# Patient Record
Sex: FEMALE | Race: BLACK OR AFRICAN AMERICAN | NOT HISPANIC OR LATINO | Employment: PART TIME | URBAN - NONMETROPOLITAN AREA
[De-identification: names, ages, dates, MRNs, and addresses within clinical notes are randomized per-mention and may not be internally consistent; named-entity substitution may affect disease eponyms.]

---

## 2017-03-31 ENCOUNTER — OFFICE VISIT (OUTPATIENT)
Dept: FAMILY MEDICINE CLINIC | Facility: CLINIC | Age: 32
End: 2017-03-31

## 2017-03-31 VITALS
DIASTOLIC BLOOD PRESSURE: 76 MMHG | SYSTOLIC BLOOD PRESSURE: 120 MMHG | BODY MASS INDEX: 28.03 KG/M2 | HEIGHT: 67 IN | WEIGHT: 178.6 LBS

## 2017-03-31 DIAGNOSIS — J30.2 SEASONAL ALLERGIC RHINITIS, UNSPECIFIED ALLERGIC RHINITIS TRIGGER: ICD-10-CM

## 2017-03-31 DIAGNOSIS — J45.20 MILD INTERMITTENT ASTHMA WITHOUT COMPLICATION: Primary | ICD-10-CM

## 2017-03-31 DIAGNOSIS — T70.20XA ALTITUDE SICKNESS, INITIAL ENCOUNTER: ICD-10-CM

## 2017-03-31 PROCEDURE — 99213 OFFICE O/P EST LOW 20 MIN: CPT | Performed by: FAMILY MEDICINE

## 2017-03-31 RX ORDER — ACETAZOLAMIDE 125 MG/1
125 TABLET ORAL 2 TIMES DAILY
Qty: 10 TABLET | Refills: 1 | Status: SHIPPED | OUTPATIENT
Start: 2017-03-31 | End: 2017-05-22

## 2017-03-31 NOTE — PROGRESS NOTES
Subjective   Hebert Olmos is a 31 y.o. female.     History of Present Illness patient was concerned about altitude sickness.  Patient has a history of asthma states she goes to [place] for meeting she developed shortness of breath and headache for about 3 days.  Symptoms are more consistent with altitude sickness rather than asthma.  States her nebulizer does help which of course is the case with altitude sickness.  Have counseled on slower lacrimation but since she can't do that she can try Diamox per day or 2 but not more than that The Dehydration Affect.  Otherwise Use Medicines As Directed.  Time Spent Counseling.    The following portions of the patient's history were reviewed and updated as appropriate: allergies, current medications, past family history, past medical history, past social history, past surgical history and problem list.    Review of Systems   Constitutional: Negative for activity change, appetite change, fatigue and unexpected weight change.   HENT: Negative for trouble swallowing and voice change.    Eyes: Negative for redness and visual disturbance.   Respiratory: Negative for cough and wheezing.    Cardiovascular: Negative for chest pain and palpitations.   Gastrointestinal: Negative for abdominal pain, constipation, diarrhea, nausea and vomiting.   Genitourinary: Negative for urgency.   Musculoskeletal: Negative for joint swelling.   Neurological: Negative for syncope and headaches.   Hematological: Negative for adenopathy.   Psychiatric/Behavioral: Negative for sleep disturbance.       Objective   Physical Exam   Constitutional: She appears well-developed.   HENT:   Head: Normocephalic.   Eyes: Pupils are equal, round, and reactive to light.   Cardiovascular: Normal rate.    Pulmonary/Chest: Effort normal.   Psychiatric: She has a normal mood and affect.       Assessment/Plan   Hebert was seen today for asthma.    Diagnoses and all orders for this visit:    Mild intermittent asthma  without complication    Seasonal allergic rhinitis, unspecified allergic rhinitis trigger    Altitude sickness, initial encounter  -     acetaZOLAMIDE (DIAMOX) 125 MG tablet; Take 1 tablet by mouth 2 (Two) Times a Day. For 1-2d for altitude

## 2017-05-22 ENCOUNTER — OFFICE VISIT (OUTPATIENT)
Dept: FAMILY MEDICINE CLINIC | Facility: CLINIC | Age: 32
End: 2017-05-22

## 2017-05-22 VITALS
WEIGHT: 180.6 LBS | SYSTOLIC BLOOD PRESSURE: 122 MMHG | BODY MASS INDEX: 28.35 KG/M2 | HEIGHT: 67 IN | DIASTOLIC BLOOD PRESSURE: 84 MMHG

## 2017-05-22 DIAGNOSIS — F43.12 CHRONIC POST-TRAUMATIC STRESS DISORDER: Chronic | ICD-10-CM

## 2017-05-22 DIAGNOSIS — F32.A DEPRESSIVE DISORDER: Chronic | ICD-10-CM

## 2017-05-22 DIAGNOSIS — F41.9 ANXIETY: Primary | Chronic | ICD-10-CM

## 2017-05-22 PROCEDURE — 99214 OFFICE O/P EST MOD 30 MIN: CPT | Performed by: FAMILY MEDICINE

## 2017-05-22 RX ORDER — HYDROXYZINE 50 MG/1
50 TABLET, FILM COATED ORAL 2 TIMES DAILY
Qty: 60 TABLET | Refills: 5 | Status: SHIPPED | OUTPATIENT
Start: 2017-05-22 | End: 2017-12-28 | Stop reason: HOSPADM

## 2017-05-22 RX ORDER — FLUOXETINE 10 MG/1
10 CAPSULE ORAL 2 TIMES DAILY
Qty: 60 CAPSULE | Refills: 5 | Status: SHIPPED | OUTPATIENT
Start: 2017-05-22 | End: 2017-06-28

## 2017-06-14 ENCOUNTER — OFFICE VISIT (OUTPATIENT)
Dept: FAMILY MEDICINE CLINIC | Facility: CLINIC | Age: 32
End: 2017-06-14

## 2017-06-14 VITALS
DIASTOLIC BLOOD PRESSURE: 84 MMHG | BODY MASS INDEX: 28.33 KG/M2 | SYSTOLIC BLOOD PRESSURE: 136 MMHG | HEIGHT: 67 IN | WEIGHT: 180.5 LBS

## 2017-06-14 DIAGNOSIS — F32.A DEPRESSIVE DISORDER: Chronic | ICD-10-CM

## 2017-06-14 DIAGNOSIS — F43.12 CHRONIC POST-TRAUMATIC STRESS DISORDER: Chronic | ICD-10-CM

## 2017-06-14 DIAGNOSIS — F41.9 ANXIETY: Primary | Chronic | ICD-10-CM

## 2017-06-14 PROCEDURE — 99214 OFFICE O/P EST MOD 30 MIN: CPT | Performed by: FAMILY MEDICINE

## 2017-06-14 RX ORDER — CLONAZEPAM 1 MG/1
TABLET ORAL
Qty: 14 TABLET | Refills: 0 | Status: SHIPPED | OUTPATIENT
Start: 2017-06-14 | End: 2017-06-28 | Stop reason: SDUPTHER

## 2017-06-14 NOTE — PROGRESS NOTES
Subjective   Hebert Olmos is a 31 y.o. female.     History of Present Illness patient states increasing anxiety apparently her anniversary of parents death this month.  Has not visited her therapist distal end of the month.  Complaining of insomnia posterior medical stress etc.  States is taking her regular medicine.  I have counseled again on the need for behavioral therapy.  Also has an upcoming colonoscopy.  History noted.    The following portions of the patient's history were reviewed and updated as appropriate: allergies, current medications, past family history, past medical history, past social history, past surgical history and problem list.    Review of Systems   Constitutional: Negative for activity change, appetite change, fatigue and unexpected weight change.   HENT: Negative for trouble swallowing and voice change.    Eyes: Negative for redness and visual disturbance.   Respiratory: Negative for cough and wheezing.    Cardiovascular: Negative for chest pain and palpitations.   Gastrointestinal: Negative for abdominal pain, constipation, diarrhea, nausea and vomiting.   Genitourinary: Negative for urgency.   Musculoskeletal: Negative for joint swelling.   Neurological: Negative for syncope and headaches.   Hematological: Negative for adenopathy.   Psychiatric/Behavioral: Positive for decreased concentration, dysphoric mood and sleep disturbance. The patient is nervous/anxious and is hyperactive.        Objective   Physical Exam   Constitutional: She is oriented to person, place, and time.   HENT:   Head: Normocephalic.   Eyes: Pupils are equal, round, and reactive to light.   Neck: Normal range of motion.   Cardiovascular: Normal rate.    Pulmonary/Chest: Effort normal.   Abdominal: Soft.   Musculoskeletal: Normal range of motion.   Neurological: She is alert and oriented to person, place, and time.   Psychiatric: She has a normal mood and affect. Her speech is rapid and/or pressured. She is  agitated. Cognition and memory are normal.       Assessment/Plan   Hebert was seen today for anxiety.    Diagnoses and all orders for this visit:    Anxiety  -     clonazePAM (KlonoPIN) 1 MG tablet; One tab bid everyday till seen again    Chronic post-traumatic stress disorder    Depressive disorder        need for therapy  on above.  Short course of clonazepam for the next 2 weeks.  Continue current medicines.  Recheck 2 weeks.

## 2017-06-28 ENCOUNTER — OFFICE VISIT (OUTPATIENT)
Dept: FAMILY MEDICINE CLINIC | Facility: CLINIC | Age: 32
End: 2017-06-28

## 2017-06-28 VITALS
DIASTOLIC BLOOD PRESSURE: 78 MMHG | BODY MASS INDEX: 27.47 KG/M2 | WEIGHT: 175 LBS | SYSTOLIC BLOOD PRESSURE: 120 MMHG | HEIGHT: 67 IN

## 2017-06-28 DIAGNOSIS — F32.A DEPRESSIVE DISORDER: Chronic | ICD-10-CM

## 2017-06-28 DIAGNOSIS — F43.12 CHRONIC POST-TRAUMATIC STRESS DISORDER: Chronic | ICD-10-CM

## 2017-06-28 DIAGNOSIS — F41.9 ANXIETY: Primary | Chronic | ICD-10-CM

## 2017-06-28 PROCEDURE — 99213 OFFICE O/P EST LOW 20 MIN: CPT | Performed by: FAMILY MEDICINE

## 2017-06-28 RX ORDER — ONDANSETRON 4 MG/1
4 TABLET, FILM COATED ORAL AS NEEDED
Refills: 0 | COMMUNITY
Start: 2017-06-22 | End: 2017-12-28 | Stop reason: HOSPADM

## 2017-06-28 RX ORDER — CITALOPRAM 20 MG/1
20 TABLET ORAL DAILY
Refills: 0 | COMMUNITY
Start: 2017-06-22 | End: 2017-12-28 | Stop reason: HOSPADM

## 2017-06-28 RX ORDER — CLONAZEPAM 1 MG/1
TABLET ORAL
Qty: 30 TABLET | Refills: 2 | Status: SHIPPED | OUTPATIENT
Start: 2017-06-28 | End: 2017-09-28 | Stop reason: SDUPTHER

## 2017-06-28 NOTE — PROGRESS NOTES
Subjective   Hebert Olmos is a 31 y.o. female.     History of Present Illness reevaluation medications namely clonazepam.  In interim patient been to her counselor at her Prozac switched to Celexa.  Feeling much better.  States clonazepam caused daytime drowsiness but started taking at night sleeping better functioning better.  We have counseled pros and cons her chronic use.  We will continue clonazepam at 1 mg daily at bedtime for the next 90 days.  Counseled following up with her behavioral counseled on therapist as outlined.  Recheck again in 3 months    The following portions of the patient's history were reviewed and updated as appropriate: allergies, current medications, past family history, past medical history, past social history, past surgical history and problem list.   ok    Review of Systems   Constitutional: Negative for activity change, appetite change, fatigue and unexpected weight change.   HENT: Negative for trouble swallowing and voice change.    Eyes: Negative for redness and visual disturbance.   Respiratory: Negative for cough and wheezing.    Cardiovascular: Negative for chest pain and palpitations.   Gastrointestinal: Negative for abdominal pain, constipation, diarrhea, nausea and vomiting.   Genitourinary: Negative for urgency.   Musculoskeletal: Negative for joint swelling.   Neurological: Negative for syncope and headaches.   Hematological: Negative for adenopathy.   Psychiatric/Behavioral: Negative for sleep disturbance.       Objective   Physical Exam   Constitutional: She appears well-developed.   HENT:   Head: Normocephalic.   Eyes: Pupils are equal, round, and reactive to light.   Neck: Normal range of motion.   Neurological: She is alert.   Psychiatric: Her speech is normal and behavior is normal. Thought content normal. Her mood appears anxious.       Assessment/Plan   Hebert was seen today for follow-up.    Diagnoses and all orders for this visit:    Anxiety  -      clonazePAM (KlonoPIN) 1 MG tablet; 1 q hs every night    Chronic post-traumatic stress disorder    Depressive disorder

## 2017-09-28 ENCOUNTER — OFFICE VISIT (OUTPATIENT)
Dept: FAMILY MEDICINE CLINIC | Facility: CLINIC | Age: 32
End: 2017-09-28

## 2017-09-28 VITALS
BODY MASS INDEX: 28.08 KG/M2 | SYSTOLIC BLOOD PRESSURE: 120 MMHG | WEIGHT: 178.9 LBS | DIASTOLIC BLOOD PRESSURE: 78 MMHG | HEIGHT: 67 IN

## 2017-09-28 DIAGNOSIS — F41.9 ANXIETY: Primary | Chronic | ICD-10-CM

## 2017-09-28 DIAGNOSIS — F32.A DEPRESSIVE DISORDER: Chronic | ICD-10-CM

## 2017-09-28 PROCEDURE — 99213 OFFICE O/P EST LOW 20 MIN: CPT | Performed by: FAMILY MEDICINE

## 2017-09-28 RX ORDER — CLONAZEPAM 1 MG/1
TABLET ORAL
Qty: 30 TABLET | Refills: 2 | Status: SHIPPED | OUTPATIENT
Start: 2017-09-28 | End: 2017-12-28 | Stop reason: HOSPADM

## 2017-09-28 NOTE — PROGRESS NOTES
Subjective   Hebert Olmos is a 32 y.o. female.     History of Present Illness  follow-up generalized anxiety disorder depressive disorder.  Continuing on counseling as well as her SSRI.  Takes clonazepam at night with good effect.  Wishes to continue..  Counseled on the need for not stopping abruptly.  Also counseled on visiting with her gynecologist routinely.  Declines flu vaccine.    The following portions of the patient's history were reviewed and updated as appropriate: allergies, current medications, past family history, past medical history, past social history, past surgical history and problem list.    Review of Systems   Constitutional: Negative for activity change, appetite change, fatigue and unexpected weight change.   HENT: Negative for trouble swallowing and voice change.    Eyes: Negative for redness and visual disturbance.   Respiratory: Negative for cough and wheezing.    Cardiovascular: Negative for chest pain and palpitations.   Gastrointestinal: Negative for abdominal pain, constipation, diarrhea, nausea and vomiting.   Genitourinary: Negative for urgency.   Musculoskeletal: Negative for joint swelling.   Neurological: Negative for syncope and headaches.   Hematological: Negative for adenopathy.   Psychiatric/Behavioral: Negative for sleep disturbance.       Objective   Physical Exam   HENT:   Head: Normocephalic.   Eyes: Pupils are equal, round, and reactive to light.   Neck: Normal range of motion.   Cardiovascular: Normal rate.    Pulmonary/Chest: Effort normal.   Abdominal: Soft.   Psychiatric: Her speech is normal and behavior is normal. Her mood appears anxious. Cognition and memory are normal.       Assessment/Plan   Hebert was seen today for anxiety.    Diagnoses and all orders for this visit:    Anxiety  -     clonazePAM (KlonoPIN) 1 MG tablet; 1 q hs every night    Depressive disorder       Follow-up as above 3 months

## 2017-12-28 ENCOUNTER — OFFICE VISIT (OUTPATIENT)
Dept: FAMILY MEDICINE CLINIC | Facility: CLINIC | Age: 32
End: 2017-12-28

## 2017-12-28 VITALS
WEIGHT: 182.7 LBS | DIASTOLIC BLOOD PRESSURE: 80 MMHG | SYSTOLIC BLOOD PRESSURE: 122 MMHG | HEIGHT: 67 IN | BODY MASS INDEX: 28.67 KG/M2

## 2017-12-28 DIAGNOSIS — F41.9 ANXIETY: Primary | Chronic | ICD-10-CM

## 2017-12-28 DIAGNOSIS — K50.00 CROHN'S DISEASE OF SMALL INTESTINE WITHOUT COMPLICATION (HCC): ICD-10-CM

## 2017-12-28 DIAGNOSIS — F32.A DEPRESSIVE DISORDER: Chronic | ICD-10-CM

## 2017-12-28 DIAGNOSIS — F43.12 CHRONIC POST-TRAUMATIC STRESS DISORDER: Chronic | ICD-10-CM

## 2017-12-28 PROCEDURE — 99213 OFFICE O/P EST LOW 20 MIN: CPT | Performed by: FAMILY MEDICINE

## 2017-12-28 RX ORDER — PROMETHAZINE HYDROCHLORIDE 12.5 MG/1
12.5 TABLET ORAL EVERY 6 HOURS PRN
COMMUNITY
End: 2018-08-21

## 2017-12-28 RX ORDER — BUDESONIDE 3 MG/1
6 CAPSULE, COATED PELLETS ORAL EVERY MORNING
COMMUNITY
End: 2019-01-09

## 2017-12-28 NOTE — PROGRESS NOTES
Subjective   Hebert Olmos is a 32 y.o. female.     History of Present Illness  reevaluation anxiety depression posterior medical stress etc.  Interim states is been diagnosed with Crohn's disease instead of IBS.  Being seen by gi for same.  Has been seeing her therapist.  Has electively stopped all her SSRI and clonazepam.  Wishes to proceed forward with talk therapy and behavior cognitive behavioral therapy.   at this point would be deferring to both her gastroenterologist as well as her therapist.  We'll be seeing on a when necessary acute bases as needed.  Also have counseled on the need to visit with her gynecologist just first year get a Pap smear.    The following portions of the patient's history were reviewed and updated as appropriate: allergies, current medications, past family history, past medical history, past social history, past surgical history and problem list.    Review of Systems   Constitutional: Negative for activity change, appetite change, fatigue and unexpected weight change.   HENT: Negative for trouble swallowing and voice change.    Eyes: Negative for redness and visual disturbance.   Respiratory: Negative for cough and wheezing.    Cardiovascular: Negative for chest pain and palpitations.   Gastrointestinal: Negative for abdominal pain, constipation, diarrhea, nausea and vomiting.   Genitourinary: Negative for urgency.   Musculoskeletal: Negative for joint swelling.   Neurological: Negative for syncope and headaches.   Hematological: Negative for adenopathy.   Psychiatric/Behavioral: Negative for sleep disturbance. The patient is nervous/anxious.        Objective   Physical Exam   HENT:   Head: Normocephalic.   Eyes: Pupils are equal, round, and reactive to light.   Neck: Normal range of motion.   Cardiovascular: Normal rate.    Pulmonary/Chest: Effort normal.   Abdominal: Soft.   Psychiatric: Her behavior is normal. Her mood appears anxious. Her speech is rapid and/or  pressured.       Assessment/Plan   Hebert was seen today for anxiety.    Diagnoses and all orders for this visit:    Anxiety    Depressive disorder    Chronic post-traumatic stress disorder    Crohn's disease of small intestine without complication     Plan plan as above

## 2018-01-02 RX ORDER — CLONAZEPAM 1 MG/1
TABLET ORAL
Qty: 30 TABLET | Refills: 0 | OUTPATIENT
Start: 2018-01-02

## 2018-03-12 ENCOUNTER — TELEPHONE (OUTPATIENT)
Dept: FAMILY MEDICINE CLINIC | Facility: CLINIC | Age: 33
End: 2018-03-12

## 2018-03-20 ENCOUNTER — TELEPHONE (OUTPATIENT)
Dept: FAMILY MEDICINE CLINIC | Facility: CLINIC | Age: 33
End: 2018-03-20

## 2018-03-20 RX ORDER — ALBUTEROL SULFATE 90 UG/1
2 AEROSOL, METERED RESPIRATORY (INHALATION) EVERY 4 HOURS PRN
Qty: 1 INHALER | Refills: 3 | Status: SHIPPED | OUTPATIENT
Start: 2018-03-20 | End: 2019-01-09 | Stop reason: SDUPTHER

## 2018-08-21 ENCOUNTER — OFFICE VISIT (OUTPATIENT)
Dept: FAMILY MEDICINE CLINIC | Facility: CLINIC | Age: 33
End: 2018-08-21

## 2018-08-21 VITALS
HEIGHT: 67 IN | DIASTOLIC BLOOD PRESSURE: 80 MMHG | WEIGHT: 164 LBS | SYSTOLIC BLOOD PRESSURE: 118 MMHG | TEMPERATURE: 98 F | BODY MASS INDEX: 25.74 KG/M2

## 2018-08-21 DIAGNOSIS — J30.2 ACUTE SEASONAL ALLERGIC RHINITIS DUE TO OTHER ALLERGEN: ICD-10-CM

## 2018-08-21 DIAGNOSIS — J06.9 ACUTE URI: Primary | ICD-10-CM

## 2018-08-21 DIAGNOSIS — K50.00 CROHN'S DISEASE OF SMALL INTESTINE WITHOUT COMPLICATION (HCC): ICD-10-CM

## 2018-08-21 PROCEDURE — 99213 OFFICE O/P EST LOW 20 MIN: CPT | Performed by: FAMILY MEDICINE

## 2018-08-21 RX ORDER — SOD CHLOR,SOD BICARB/NETI POT
PACKET, WITH RINSE DEVICE NASAL
Qty: 50 EACH | Refills: 5 | Status: SHIPPED | OUTPATIENT
Start: 2018-08-21 | End: 2019-01-09

## 2018-08-21 RX ORDER — PREDNISONE 20 MG/1
TABLET ORAL
Qty: 10 TABLET | Refills: 0 | Status: SHIPPED | OUTPATIENT
Start: 2018-08-21 | End: 2019-01-09

## 2018-08-21 RX ORDER — FLUTICASONE PROPIONATE 50 MCG
2 SPRAY, SUSPENSION (ML) NASAL DAILY
Qty: 1 BOTTLE | Refills: 5 | Status: SHIPPED | OUTPATIENT
Start: 2018-08-21 | End: 2019-01-09

## 2018-08-21 NOTE — PROGRESS NOTES
Subjective   Hebert Olmos is a 33 y.o. female.     History of Present Illness   resting evaluation to 3 days sinus pressure drainage postnasal drip cough.  Some infectious some environmental overlay.  Not been treating it with anything.  States Crohn's disease is pretty much under control and is due for follow-up with GI next month.  Is on no long-term renal suppressants at present.  The following portions of the patient's history were reviewed and updated as appropriate: allergies, current medications, past family history, past medical history, past social history, past surgical history and problem list.    Review of Systems   Constitutional: Negative for activity change, appetite change, fatigue and unexpected weight change.   HENT: Positive for congestion, rhinorrhea and sinus pressure. Negative for trouble swallowing and voice change.    Eyes: Negative for redness and visual disturbance.   Respiratory: Positive for cough. Negative for wheezing.    Cardiovascular: Negative for chest pain and palpitations.   Gastrointestinal: Negative for abdominal pain, constipation, diarrhea, nausea and vomiting.   Genitourinary: Negative for urgency.   Musculoskeletal: Negative for joint swelling.   Neurological: Positive for headaches. Negative for syncope.   Hematological: Negative for adenopathy.   Psychiatric/Behavioral: Negative for sleep disturbance.       Objective   Physical Exam   Constitutional: She is oriented to person, place, and time. She appears well-developed.   HENT:   Head: Normocephalic.   Right Ear: External ear normal.   Nose: Mucosal edema and rhinorrhea present.   Mouth/Throat: Oropharynx is clear and moist.   Eyes: Pupils are equal, round, and reactive to light.   Minimal allergic shiners   Neck: Normal range of motion. No thyromegaly present.   Cardiovascular: Normal rate, regular rhythm, normal heart sounds and intact distal pulses.  Exam reveals no gallop and no friction rub.    No murmur  heard.  Pulmonary/Chest: Breath sounds normal.   Abdominal: Soft. She exhibits no distension and no mass. There is no tenderness.   Musculoskeletal: Normal range of motion.   Neurological: She is alert and oriented to person, place, and time. She has normal reflexes.   Skin: Skin is warm and dry.   Psychiatric: She has a normal mood and affect.       Assessment/Plan   Hebert was seen today for sinusitis.    Diagnoses and all orders for this visit:    Acute URI  -     fluticasone (FLONASE) 50 MCG/ACT nasal spray; 2 sprays into the nostril(s) as directed by provider Daily. X 4wks then prn  -     Hypertonic Nasal Wash (NASAFLO NETI POT NASAL WASH) pack; Use bid prn  -     predniSONE (DELTASONE) 20 MG tablet; 2qdx 5    Acute seasonal allergic rhinitis due to other allergen  -     fluticasone (FLONASE) 50 MCG/ACT nasal spray; 2 sprays into the nostril(s) as directed by provider Daily. X 4wks then prn  -     Hypertonic Nasal Wash (NASAFLO NETI POT NASAL WASH) pack; Use bid prn  -     predniSONE (DELTASONE) 20 MG tablet; 2qdx 5    Crohn's disease of small intestine without complication (CMS/HCC)        Counseled on short-term medicines hydration some environmental control measures flu vaccine in the fall recheck as directed

## 2019-01-09 ENCOUNTER — OFFICE VISIT (OUTPATIENT)
Dept: FAMILY MEDICINE CLINIC | Facility: CLINIC | Age: 34
End: 2019-01-09

## 2019-01-09 VITALS
HEIGHT: 67 IN | TEMPERATURE: 97 F | SYSTOLIC BLOOD PRESSURE: 120 MMHG | WEIGHT: 159.1 LBS | BODY MASS INDEX: 24.97 KG/M2 | DIASTOLIC BLOOD PRESSURE: 82 MMHG

## 2019-01-09 DIAGNOSIS — J06.9 ACUTE URI: Primary | ICD-10-CM

## 2019-01-09 DIAGNOSIS — J45.20 MILD INTERMITTENT ASTHMA WITHOUT COMPLICATION: Chronic | ICD-10-CM

## 2019-01-09 DIAGNOSIS — J40 BRONCHITIS: ICD-10-CM

## 2019-01-09 DIAGNOSIS — R06.2 WHEEZING: ICD-10-CM

## 2019-01-09 PROCEDURE — 96372 THER/PROPH/DIAG INJ SC/IM: CPT | Performed by: FAMILY MEDICINE

## 2019-01-09 PROCEDURE — 99214 OFFICE O/P EST MOD 30 MIN: CPT | Performed by: FAMILY MEDICINE

## 2019-01-09 RX ORDER — PREDNISONE 20 MG/1
TABLET ORAL
Qty: 15 TABLET | Refills: 0 | Status: SHIPPED | OUTPATIENT
Start: 2019-01-09 | End: 2020-02-26

## 2019-01-09 RX ORDER — ALBUTEROL SULFATE 1.25 MG/3ML
1 SOLUTION RESPIRATORY (INHALATION) EVERY 6 HOURS PRN
Qty: 90 VIAL | Refills: 12 | Status: SHIPPED | OUTPATIENT
Start: 2019-01-09

## 2019-01-09 RX ORDER — ALBUTEROL SULFATE 90 UG/1
2 AEROSOL, METERED RESPIRATORY (INHALATION) EVERY 4 HOURS PRN
Qty: 1 INHALER | Refills: 3 | Status: SHIPPED | OUTPATIENT
Start: 2019-01-09 | End: 2020-02-26

## 2019-01-09 RX ORDER — TRIAMCINOLONE ACETONIDE 40 MG/ML
40 INJECTION, SUSPENSION INTRA-ARTICULAR; INTRAMUSCULAR ONCE
Status: COMPLETED | OUTPATIENT
Start: 2019-01-09 | End: 2019-01-09

## 2019-01-09 RX ORDER — LEVOFLOXACIN 500 MG/1
500 TABLET, FILM COATED ORAL DAILY
Qty: 10 TABLET | Refills: 0 | OUTPATIENT
Start: 2019-01-09 | End: 2019-08-16

## 2019-01-09 RX ADMIN — TRIAMCINOLONE ACETONIDE 40 MG: 40 INJECTION, SUSPENSION INTRA-ARTICULAR; INTRAMUSCULAR at 14:57

## 2019-01-09 NOTE — PROGRESS NOTES
Subjective   Hebert Olmos is a 33 y.o. female.     History of Present Illness   request evaluation 1 week history of cough congestion coryza wheeze flare of asthma.  No real fever minimal sputum production have been using nebulizer at home.  Has not had a flu vaccine yet will get in the future..  Also is been little with GI specialist was told may have gastroparesis and may require a gastric pacemaker in the future    The following portions of the patient's history were reviewed and updated as appropriate: allergies, current medications, past family history, past medical history, past social history, past surgical history and problem list.    Review of Systems   Constitutional: Negative for activity change, appetite change, fatigue and unexpected weight change.   HENT: Positive for congestion. Negative for trouble swallowing and voice change.    Eyes: Negative for redness and visual disturbance.   Respiratory: Positive for cough, shortness of breath and wheezing.    Cardiovascular: Negative for chest pain and palpitations.   Gastrointestinal: Negative for abdominal pain, constipation, diarrhea, nausea and vomiting.   Genitourinary: Negative for urgency.   Musculoskeletal: Negative for joint swelling.   Neurological: Negative for syncope and headaches.   Hematological: Negative for adenopathy.   Psychiatric/Behavioral: Negative for sleep disturbance.       Objective   Physical Exam   Constitutional: She is oriented to person, place, and time. She appears well-developed.   HENT:   Head: Normocephalic.   Right Ear: External ear normal.   Nose: Mucosal edema present.   Mouth/Throat: Oropharynx is clear and moist.   Eyes: Pupils are equal, round, and reactive to light.   Neck: Normal range of motion. No thyromegaly present.   Cardiovascular: Normal rate, regular rhythm, normal heart sounds and intact distal pulses. Exam reveals no gallop and no friction rub.   No murmur heard.  Pulmonary/Chest: She has rhonchi in  the right middle field, the left middle field and the left lower field.   Expiratory wheezes mention minimal increased expiratory phase normal rate intermittent coughing   Abdominal: Soft. She exhibits no distension and no mass. There is no tenderness.   Musculoskeletal: Normal range of motion.   Neurological: She is alert and oriented to person, place, and time. She has normal reflexes.   Skin: Skin is warm and dry.   Psychiatric: She has a normal mood and affect.       Assessment/Plan   Hebert was seen today for cough.    Diagnoses and all orders for this visit:    Acute URI    Bronchitis  -     albuterol (ACCUNEB) 1.25 MG/3ML nebulizer solution; Take 3 mL by nebulization Every 6 (Six) Hours As Needed for Wheezing.  -     predniSONE (DELTASONE) 20 MG tablet; 2 qd x 5 then 1qd x 5 stop  For lungs  -     levoFLOXacin (LEVAQUIN) 500 MG tablet; Take 1 tablet by mouth Daily. Till gone for lungs    Wheezing  -     albuterol sulfate  (90 Base) MCG/ACT inhaler; Inhale 2 puffs Every 4 (Four) Hours As Needed for Wheezing.  -     triamcinolone acetonide (KENALOG-40) injection 40 mg; Inject 1 mL into the appropriate muscle as directed by prescriber 1 (One) Time.  -     albuterol (ACCUNEB) 1.25 MG/3ML nebulizer solution; Take 3 mL by nebulization Every 6 (Six) Hours As Needed for Wheezing.  -     predniSONE (DELTASONE) 20 MG tablet; 2 qd x 5 then 1qd x 5 stop  For lungs  -     levoFLOXacin (LEVAQUIN) 500 MG tablet; Take 1 tablet by mouth Daily. Till gone for lungs    Mild intermittent asthma without complication  -     albuterol (ACCUNEB) 1.25 MG/3ML nebulizer solution; Take 3 mL by nebulization Every 6 (Six) Hours As Needed for Wheezing.  -     predniSONE (DELTASONE) 20 MG tablet; 2 qd x 5 then 1qd x 5 stop  For lungs  -     levoFLOXacin (LEVAQUIN) 500 MG tablet; Take 1 tablet by mouth Daily. Till gone for lungs       need for increasing hydration fluids vaporizer's etc. increased nebulizer use short-term  medication ordered counseled on disease process rechecks directed

## 2019-03-31 ENCOUNTER — APPOINTMENT (OUTPATIENT)
Dept: GENERAL RADIOLOGY | Facility: HOSPITAL | Age: 34
End: 2019-03-31

## 2019-03-31 ENCOUNTER — HOSPITAL ENCOUNTER (EMERGENCY)
Facility: HOSPITAL | Age: 34
Discharge: HOME | End: 2019-03-31
Attending: EMERGENCY MEDICINE | Admitting: EMERGENCY MEDICINE

## 2019-03-31 VITALS
BODY MASS INDEX: 23.16 KG/M2 | WEIGHT: 147.56 LBS | TEMPERATURE: 97.7 F | HEART RATE: 72 BPM | OXYGEN SATURATION: 100 % | SYSTOLIC BLOOD PRESSURE: 110 MMHG | RESPIRATION RATE: 18 BRPM | HEIGHT: 67 IN | DIASTOLIC BLOOD PRESSURE: 65 MMHG

## 2019-03-31 DIAGNOSIS — R11.2 NON-INTRACTABLE VOMITING WITH NAUSEA, UNSPECIFIED VOMITING TYPE: Primary | ICD-10-CM

## 2019-03-31 DIAGNOSIS — K31.84 GASTROPARESIS: ICD-10-CM

## 2019-03-31 LAB
ALBUMIN SERPL-MCNC: 4.8 G/DL (ref 3.5–5.2)
ALBUMIN/GLOB SERPL: 1.2 G/DL
ALP SERPL-CCNC: 64 U/L (ref 39–117)
ALT SERPL W P-5'-P-CCNC: 14 U/L (ref 1–33)
ANION GAP SERPL CALCULATED.3IONS-SCNC: 17 MMOL/L
AST SERPL-CCNC: 18 U/L (ref 1–32)
BACTERIA UR QL AUTO: ABNORMAL /HPF
BASOPHILS # BLD AUTO: 0.04 10*3/MM3 (ref 0–0.2)
BASOPHILS NFR BLD AUTO: 0.3 % (ref 0–1.5)
BILIRUB SERPL-MCNC: 0.4 MG/DL (ref 0.2–1.2)
BILIRUB UR QL STRIP: ABNORMAL
BUN BLD-MCNC: 15 MG/DL (ref 6–20)
BUN/CREAT SERPL: 17.2 (ref 7–25)
CALCIUM SPEC-SCNC: 10 MG/DL (ref 8.6–10.5)
CHLORIDE SERPL-SCNC: 100 MMOL/L (ref 98–107)
CLARITY UR: ABNORMAL
CO2 SERPL-SCNC: 22 MMOL/L (ref 22–29)
COLOR UR: YELLOW
CREAT BLD-MCNC: 0.87 MG/DL (ref 0.57–1)
DEPRECATED RDW RBC AUTO: 46.5 FL (ref 37–54)
EOSINOPHIL # BLD AUTO: 0.04 10*3/MM3 (ref 0–0.4)
EOSINOPHIL NFR BLD AUTO: 0.3 % (ref 0.3–6.2)
ERYTHROCYTE [DISTWIDTH] IN BLOOD BY AUTOMATED COUNT: 13.7 % (ref 12.3–15.4)
GFR SERPL CREATININE-BSD FRML MDRD: 90 ML/MIN/1.73
GLOBULIN UR ELPH-MCNC: 4.1 GM/DL
GLUCOSE BLD-MCNC: 102 MG/DL (ref 65–99)
GLUCOSE UR STRIP-MCNC: NEGATIVE MG/DL
HCG SERPL QL: NEGATIVE
HCT VFR BLD AUTO: 47.5 % (ref 34–46.6)
HGB BLD-MCNC: 15.8 G/DL (ref 12–15.9)
HGB UR QL STRIP.AUTO: ABNORMAL
HOLD SPECIMEN: NORMAL
HOLD SPECIMEN: NORMAL
HYALINE CASTS UR QL AUTO: ABNORMAL /LPF
IMM GRANULOCYTES # BLD AUTO: 0.05 10*3/MM3 (ref 0–0.05)
IMM GRANULOCYTES NFR BLD AUTO: 0.4 % (ref 0–0.5)
KETONES UR QL STRIP: ABNORMAL
LEUKOCYTE ESTERASE UR QL STRIP.AUTO: NEGATIVE
LYMPHOCYTES # BLD AUTO: 0.99 10*3/MM3 (ref 0.7–3.1)
LYMPHOCYTES NFR BLD AUTO: 8.4 % (ref 19.6–45.3)
MCH RBC QN AUTO: 30.3 PG (ref 26.6–33)
MCHC RBC AUTO-ENTMCNC: 33.3 G/DL (ref 31.5–35.7)
MCV RBC AUTO: 91 FL (ref 79–97)
MONOCYTES # BLD AUTO: 0.72 10*3/MM3 (ref 0.1–0.9)
MONOCYTES NFR BLD AUTO: 6.1 % (ref 5–12)
NEUTROPHILS # BLD AUTO: 9.95 10*3/MM3 (ref 1.4–7)
NEUTROPHILS NFR BLD AUTO: 84.5 % (ref 42.7–76)
NITRITE UR QL STRIP: NEGATIVE
NRBC BLD AUTO-RTO: 0 /100 WBC (ref 0–0)
PH UR STRIP.AUTO: 5.5 [PH] (ref 5–9)
PLATELET # BLD AUTO: 226 10*3/MM3 (ref 140–450)
PMV BLD AUTO: 11.3 FL (ref 6–12)
POTASSIUM BLD-SCNC: 4.5 MMOL/L (ref 3.5–5.2)
PROT SERPL-MCNC: 8.9 G/DL (ref 6–8.5)
PROT UR QL STRIP: ABNORMAL
RBC # BLD AUTO: 5.22 10*6/MM3 (ref 3.77–5.28)
RBC # UR: ABNORMAL /HPF
REF LAB TEST METHOD: ABNORMAL
SODIUM BLD-SCNC: 139 MMOL/L (ref 136–145)
SP GR UR STRIP: 1.03 (ref 1–1.03)
SQUAMOUS #/AREA URNS HPF: ABNORMAL /HPF
UROBILINOGEN UR QL STRIP: ABNORMAL
WBC NRBC COR # BLD: 11.79 10*3/MM3 (ref 3.4–10.8)
WBC UR QL AUTO: ABNORMAL /HPF
WHOLE BLOOD HOLD SPECIMEN: NORMAL
WHOLE BLOOD HOLD SPECIMEN: NORMAL

## 2019-03-31 PROCEDURE — 81001 URINALYSIS AUTO W/SCOPE: CPT | Performed by: EMERGENCY MEDICINE

## 2019-03-31 PROCEDURE — 84703 CHORIONIC GONADOTROPIN ASSAY: CPT | Performed by: NURSE PRACTITIONER

## 2019-03-31 PROCEDURE — 99284 EMERGENCY DEPT VISIT MOD MDM: CPT

## 2019-03-31 PROCEDURE — 80053 COMPREHEN METABOLIC PANEL: CPT | Performed by: NURSE PRACTITIONER

## 2019-03-31 PROCEDURE — 25010000002 METOCLOPRAMIDE PER 10 MG: Performed by: NURSE PRACTITIONER

## 2019-03-31 PROCEDURE — 96374 THER/PROPH/DIAG INJ IV PUSH: CPT

## 2019-03-31 PROCEDURE — 85025 COMPLETE CBC W/AUTO DIFF WBC: CPT | Performed by: NURSE PRACTITIONER

## 2019-03-31 RX ORDER — METOCLOPRAMIDE 10 MG/1
10 TABLET ORAL EVERY 6 HOURS PRN
Qty: 12 TABLET | Refills: 0 | Status: SHIPPED | OUTPATIENT
Start: 2019-03-31 | End: 2020-02-26

## 2019-03-31 RX ORDER — SODIUM CHLORIDE 0.9 % (FLUSH) 0.9 %
10 SYRINGE (ML) INJECTION AS NEEDED
Status: DISCONTINUED | OUTPATIENT
Start: 2019-03-31 | End: 2019-03-31 | Stop reason: HOSPADM

## 2019-03-31 RX ORDER — METOCLOPRAMIDE HYDROCHLORIDE 5 MG/ML
10 INJECTION INTRAMUSCULAR; INTRAVENOUS ONCE
Status: COMPLETED | OUTPATIENT
Start: 2019-03-31 | End: 2019-03-31

## 2019-03-31 RX ADMIN — METOCLOPRAMIDE 10 MG: 5 INJECTION, SOLUTION INTRAMUSCULAR; INTRAVENOUS at 10:10

## 2019-03-31 RX ADMIN — SODIUM CHLORIDE 1000 ML: 900 INJECTION, SOLUTION INTRAVENOUS at 10:09

## 2019-06-01 ENCOUNTER — HOSPITAL ENCOUNTER (EMERGENCY)
Facility: HOSPITAL | Age: 34
Discharge: HOME | End: 2019-06-01
Attending: FAMILY MEDICINE | Admitting: FAMILY MEDICINE

## 2019-06-01 VITALS
WEIGHT: 152 LBS | RESPIRATION RATE: 18 BRPM | SYSTOLIC BLOOD PRESSURE: 117 MMHG | TEMPERATURE: 98.2 F | HEIGHT: 67 IN | OXYGEN SATURATION: 100 % | HEART RATE: 85 BPM | DIASTOLIC BLOOD PRESSURE: 75 MMHG | BODY MASS INDEX: 23.86 KG/M2

## 2019-06-01 DIAGNOSIS — K31.84 GASTROPARESIS: Primary | ICD-10-CM

## 2019-06-01 PROCEDURE — 99282 EMERGENCY DEPT VISIT SF MDM: CPT

## 2019-06-01 NOTE — ED PROVIDER NOTES
Subjective   Abdominal Pain: Patient complains of abdominal pain. The pain is described as pressure-like, and is 4/10 in intensity. Pain is located in the diffusely without radiation. Onset was 2 days ago. Symptoms have been gradually worsening since. Aggravating factors: eating.  Alleviating factors: functioning gastric motility device. Associated symptoms: nausea. The patient denies anorexia, belching, chills, constipation, diarrhea, dysuria, fever, frequency, headache, hematochezia, hematuria, melena, myalgias, sweats and vomiting. Pt does report bowel movement this morning formed without issues.               Review of Systems   Constitutional: Negative for activity change, appetite change, chills, diaphoresis and fever.   HENT: Negative for congestion, rhinorrhea, sinus pressure, sinus pain and sore throat.    Eyes: Negative for visual disturbance.   Respiratory: Negative for apnea, cough, choking, chest tightness, shortness of breath and wheezing.    Cardiovascular: Negative for chest pain, palpitations and leg swelling.   Gastrointestinal: Positive for abdominal distention, abdominal pain and nausea. Negative for blood in stool, constipation, diarrhea and vomiting.   Genitourinary: Negative for difficulty urinating, dysuria, frequency, hematuria and urgency.   Musculoskeletal: Negative for arthralgias, back pain, joint swelling, myalgias and neck pain.   Skin: Negative for color change, pallor, rash and wound.   Neurological: Negative for dizziness, weakness, numbness and headaches.   Psychiatric/Behavioral: Negative for agitation and behavioral problems.       Past Medical History:   Diagnosis Date   • Abdominal pain     RLQ   • Abdominal pain     RUQ   • Allergic rhinitis    • Alopecia    • Anxiety    • Asthma    • Bipolar 1 disorder (CMS/HCC)    • Chest pain    • Chronic abdominal pain    • Chronic diarrhea    • Chronic post-traumatic stress disorder    • Constipation    • Depressive disorder    •  Dysmenorrhea     Suspect secondary to adenomyosis      • Dyspnea    • Epigastric pain    • Gastritis    • Generalized abdominal pain    • Generalized anxiety disorder    • Hematochezia    • IBS (irritable bowel syndrome)     Without diarrhea   • Nausea & vomiting    • Pain in pelvis    • Tachycardia        Allergies   Allergen Reactions   • Eggs Or Egg-Derived Products GI Intolerance   • Fish-Derived Products Diarrhea     V/D   • Milk-Related Compounds Diarrhea     V/d   • Other Diarrhea     peanuts       Past Surgical History:   Procedure Laterality Date   • COLONOSCOPY W/ POLYPECTOMY  04/17/2015    Internal and external hemorrhoids found   • ESOPHAGOSCOPY / EGD  04/17/2015    With biopsy-Normal esophagus. Gastritis was found in the stomach. Biopsy taken. Normal duodenum. Biopsy taken   • INJECTION OF MEDICATION  01/08/2014    Kenalog   • INJECTION OF MEDICATION  04/11/2014    Toradol   • OTHER SURGICAL HISTORY  11/26/2014    Gynecologic surgery-Exam under anesthesia, diagnostic hysteroscopy, fractional dilation and curettage, balloon thermal ablation   • TUBAL ABDOMINAL LIGATION  10/19/2011    with Filshie clips & removal of intrauterine device   • VAGINAL DELIVERY  2010    x3       Family History   Problem Relation Age of Onset   • Heart disease Other        Social History     Socioeconomic History   • Marital status: Single     Spouse name: Not on file   • Number of children: Not on file   • Years of education: Not on file   • Highest education level: Not on file   Tobacco Use   • Smoking status: Former Smoker   Substance and Sexual Activity   • Alcohol use: No   • Drug use: No           Objective   Physical Exam   Constitutional: She is oriented to person, place, and time. She appears well-developed and well-nourished. No distress.   HENT:   Head: Normocephalic and atraumatic.   Eyes: Conjunctivae and EOM are normal. Right eye exhibits no discharge. Left eye exhibits no discharge. No scleral icterus.   Neck:  Normal range of motion. Neck supple.   Cardiovascular: Normal rate, regular rhythm, normal heart sounds and intact distal pulses. Exam reveals no gallop and no friction rub.   No murmur heard.  Pulmonary/Chest: Effort normal and breath sounds normal. No respiratory distress. She has no wheezes. She has no rales. She exhibits no tenderness.   Abdominal: Soft. Bowel sounds are normal. She exhibits no distension and no mass. There is no tenderness. There is no guarding.   Musculoskeletal: Normal range of motion. She exhibits no edema, tenderness or deformity.   Neurological: She is alert and oriented to person, place, and time. No cranial nerve deficit.   Skin: Skin is warm and dry. Capillary refill takes 2 to 3 seconds. She is not diaphoretic.   Psychiatric: She has a normal mood and affect. Her behavior is normal. Judgment and thought content normal.       Procedures           ED Course    Attempted repiar of screws and electrodes with screwdriver kit, unsuccessfull. Dr. Kaiser called in [facility] and if repair unsuccesssful, recommended non emrgent drive up to [facility] for repair and switching out of device.               MDM  Number of Diagnoses or Management Options  Risk of Complications, Morbidity, and/or Mortality  Presenting problems: minimal  Diagnostic procedures: minimal  Management options: minimal    Critical Care  Total time providing critical care: < 30 minutes    Patient Progress  Patient progress: stable        Final diagnoses:   Gastroparesis            Thompson Marshall III, MD  Resident  06/01/19 1002       Thompson Marshall III, MD  Resident  06/03/19 2015

## 2019-06-01 NOTE — DISCHARGE INSTRUCTIONS
Drive to [facility] for repair or switch out of device.  Gastroparesis  Gastroparesis, also called delayed gastric emptying, is a condition in which food takes longer than normal to empty from the stomach. The condition is usually long-lasting (chronic).  What are the causes?  This condition may be caused by:  · An endocrine disorder, such as hypothyroidism or diabetes. Diabetes is the most common cause of this condition.  · A nervous system disease, such as Parkinson disease or multiple sclerosis.  · Cancer, infection, or surgery of the stomach or vagus nerve.  · A connective tissue disorder, such as scleroderma.  · Certain medicines.    In most cases, the cause is not known.  What increases the risk?  This condition is more likely to develop in:  · People with certain disorders, including endocrine disorders, eating disorders, amyloidosis, and scleroderma.  · People with certain diseases, including Parkinson disease or multiple sclerosis.  · People with cancer or infection of the stomach or vagus nerve.  · People who have had surgery on the stomach or vagus nerve.  · People who take certain medicines.  · Women.    What are the signs or symptoms?  Symptoms of this condition include:  · An early feeling of fullness when eating.  · Nausea.  · Weight loss.  · Vomiting.  · Heartburn.  · Abdominal bloating.  · Inconsistent blood glucose levels.  · Lack of appetite.  · Acid from the stomach coming up into the esophagus (gastroesophageal reflux).  · Spasms of the stomach.    Symptoms may come and go.  How is this diagnosed?  This condition is diagnosed with tests, such as:  · Tests that check how long it takes food to move through the stomach and intestines. These tests include:  ? Upper gastrointestinal (GI) series. In this test, X-rays of the intestines are taken after you drink a liquid. The liquid makes the intestines show up better on the X-rays.  ? Gastric emptying scintigraphy. In this test, scans are taken after  you eat food that contains a small amount of radioactive material.  ? Wireless capsule GI monitoring system. This test involves swallowing a capsule that records information about movement through the stomach.  · Gastric manometry. This test measures electrical and muscular activity in the stomach. It is done with a thin tube that is passed down the throat and into the stomach.  · Endoscopy. This test checks for abnormalities in the lining of the stomach. It is done with a long, thin tube that is passed down the throat and into the stomach.  · An ultrasound. This test can help rule out gallbladder disease or pancreatitis as a cause of your symptoms. It uses sound waves to take pictures of the inside of your body.    How is this treated?  There is no cure for gastroparesis. This condition may be managed with:  · Treatment of the underlying condition causing the gastroparesis.  · Lifestyle changes, including exercise and dietary changes. Dietary changes can include:  ? Changes in what and when you eat.  ? Eating smaller meals more often.  ? Eating low-fat foods.  ? Eating low-fiber forms of high-fiber foods, such as cooked vegetables instead of raw vegetables.  ? Having liquid foods in place of solid foods. Liquid foods are easier to digest.  · Medicines. These may be given to control nausea and vomiting and to stimulate stomach muscles.  · Getting food through a feeding tube. This may be done in severe cases.  · A gastric neurostimulator. This is a device that is inserted into the body with surgery. It helps improve stomach emptying and control nausea and vomiting.    Follow these instructions at home:  · Follow your health care provider's instructions about exercise and diet.  · Take medicines only as directed by your health care provider.  Contact a health care provider if:  · Your symptoms do not improve with treatment.  · You have new symptoms.  Get help right away if:  · You have severe abdominal pain that does  not improve with treatment.  · You have nausea that does not go away.  · You cannot keep fluids down.  This information is not intended to replace advice given to you by your health care provider. Make sure you discuss any questions you have with your health care provider.  Document Released: 12/18/2006 Document Revised: 05/25/2017 Document Reviewed: 12/14/2015  Software 2000 Interactive Patient Education © 2018 [employer]

## 2019-07-15 ENCOUNTER — CLINICAL SUPPORT (OUTPATIENT)
Dept: FAMILY MEDICINE CLINIC | Facility: CLINIC | Age: 34
End: 2019-07-15

## 2019-07-15 DIAGNOSIS — Z23 NEED FOR PROPHYLACTIC VACCINATION WITH TUBERCULOSIS (BCG) VACCINE: Primary | ICD-10-CM

## 2019-07-15 PROCEDURE — 86580 TB INTRADERMAL TEST: CPT | Performed by: FAMILY MEDICINE

## 2019-10-11 ENCOUNTER — LAB (OUTPATIENT)
Dept: LAB | Facility: HOSPITAL | Age: 34
End: 2019-10-11

## 2019-10-11 ENCOUNTER — HOSPITAL ENCOUNTER (OUTPATIENT)
Dept: GENERAL RADIOLOGY | Facility: HOSPITAL | Age: 34
Discharge: HOME | End: 2019-10-11
Admitting: SURGERY

## 2019-10-11 ENCOUNTER — TRANSCRIBE ORDERS (OUTPATIENT)
Dept: LAB | Facility: HOSPITAL | Age: 34
End: 2019-10-11

## 2019-10-11 DIAGNOSIS — K31.84 GASTROPARESIS: Primary | ICD-10-CM

## 2019-10-11 DIAGNOSIS — J45.909 ALLERGIC ASTHMA WITH STATED CAUSE: ICD-10-CM

## 2019-10-11 DIAGNOSIS — K31.84 GASTROPARESIS: ICD-10-CM

## 2019-10-11 DIAGNOSIS — Z01.818 PREOP EXAMINATION: ICD-10-CM

## 2019-10-11 DIAGNOSIS — R10.9 ABDOMINAL PAIN, UNSPECIFIED ABDOMINAL LOCATION: ICD-10-CM

## 2019-10-11 LAB
ALBUMIN SERPL-MCNC: 4.6 G/DL (ref 3.5–5.2)
ALBUMIN/GLOB SERPL: 1.6 G/DL
ALP SERPL-CCNC: 48 U/L (ref 39–117)
ALT SERPL W P-5'-P-CCNC: 13 U/L (ref 1–33)
ANION GAP SERPL CALCULATED.3IONS-SCNC: 7 MMOL/L (ref 5–15)
AST SERPL-CCNC: 18 U/L (ref 1–32)
BASOPHILS # BLD AUTO: 0.03 10*3/MM3 (ref 0–0.2)
BASOPHILS NFR BLD AUTO: 0.7 % (ref 0–1.5)
BILIRUB SERPL-MCNC: 0.2 MG/DL (ref 0.2–1.2)
BUN BLD-MCNC: 10 MG/DL (ref 6–20)
BUN/CREAT SERPL: 12.5 (ref 7–25)
CALCIUM SPEC-SCNC: 9.3 MG/DL (ref 8.6–10.5)
CHLORIDE SERPL-SCNC: 106 MMOL/L (ref 98–107)
CO2 SERPL-SCNC: 28 MMOL/L (ref 22–29)
CREAT BLD-MCNC: 0.8 MG/DL (ref 0.57–1)
DEPRECATED RDW RBC AUTO: 49.7 FL (ref 37–54)
EOSINOPHIL # BLD AUTO: 0.04 10*3/MM3 (ref 0–0.4)
EOSINOPHIL NFR BLD AUTO: 1 % (ref 0.3–6.2)
ERYTHROCYTE [DISTWIDTH] IN BLOOD BY AUTOMATED COUNT: 14.6 % (ref 12.3–15.4)
GFR SERPL CREATININE-BSD FRML MDRD: 98 ML/MIN/1.73
GLOBULIN UR ELPH-MCNC: 2.9 GM/DL
GLUCOSE BLD-MCNC: 86 MG/DL (ref 65–99)
HCT VFR BLD AUTO: 38.7 % (ref 34–46.6)
HGB BLD-MCNC: 12.9 G/DL (ref 12–15.9)
IMM GRANULOCYTES # BLD AUTO: 0.01 10*3/MM3 (ref 0–0.05)
IMM GRANULOCYTES NFR BLD AUTO: 0.2 % (ref 0–0.5)
LYMPHOCYTES # BLD AUTO: 1.49 10*3/MM3 (ref 0.7–3.1)
LYMPHOCYTES NFR BLD AUTO: 36.5 % (ref 19.6–45.3)
MCH RBC QN AUTO: 30.6 PG (ref 26.6–33)
MCHC RBC AUTO-ENTMCNC: 33.3 G/DL (ref 31.5–35.7)
MCV RBC AUTO: 91.7 FL (ref 79–97)
MONOCYTES # BLD AUTO: 0.3 10*3/MM3 (ref 0.1–0.9)
MONOCYTES NFR BLD AUTO: 7.4 % (ref 5–12)
NEUTROPHILS # BLD AUTO: 2.21 10*3/MM3 (ref 1.7–7)
NEUTROPHILS NFR BLD AUTO: 54.2 % (ref 42.7–76)
NRBC BLD AUTO-RTO: 0 /100 WBC (ref 0–0.2)
PLATELET # BLD AUTO: 180 10*3/MM3 (ref 140–450)
PMV BLD AUTO: 10.3 FL (ref 6–12)
POTASSIUM BLD-SCNC: 4.2 MMOL/L (ref 3.5–5.2)
PROT SERPL-MCNC: 7.5 G/DL (ref 6–8.5)
RBC # BLD AUTO: 4.22 10*6/MM3 (ref 3.77–5.28)
SODIUM BLD-SCNC: 141 MMOL/L (ref 136–145)
WBC NRBC COR # BLD: 4.08 10*3/MM3 (ref 3.4–10.8)

## 2019-10-11 PROCEDURE — 36415 COLL VENOUS BLD VENIPUNCTURE: CPT

## 2019-10-11 PROCEDURE — 85025 COMPLETE CBC W/AUTO DIFF WBC: CPT

## 2019-10-11 PROCEDURE — 80053 COMPREHEN METABOLIC PANEL: CPT

## 2019-10-11 PROCEDURE — 71046 X-RAY EXAM CHEST 2 VIEWS: CPT

## 2019-10-28 ENCOUNTER — TELEPHONE (OUTPATIENT)
Dept: FAMILY MEDICINE CLINIC | Facility: CLINIC | Age: 34
End: 2019-10-28

## 2019-10-30 DIAGNOSIS — K50.00 CROHN'S DISEASE OF SMALL INTESTINE WITHOUT COMPLICATION (HCC): Primary | Chronic | ICD-10-CM

## 2019-10-30 DIAGNOSIS — K31.84 GASTROPARESIS: ICD-10-CM

## 2019-12-15 ENCOUNTER — APPOINTMENT (OUTPATIENT)
Dept: GENERAL RADIOLOGY | Facility: HOSPITAL | Age: 34
End: 2019-12-15

## 2019-12-15 ENCOUNTER — HOSPITAL ENCOUNTER (EMERGENCY)
Facility: HOSPITAL | Age: 34
Discharge: HOME | End: 2019-12-15
Attending: EMERGENCY MEDICINE | Admitting: EMERGENCY MEDICINE

## 2019-12-15 VITALS
BODY MASS INDEX: 21.97 KG/M2 | DIASTOLIC BLOOD PRESSURE: 77 MMHG | HEART RATE: 108 BPM | SYSTOLIC BLOOD PRESSURE: 125 MMHG | RESPIRATION RATE: 20 BRPM | TEMPERATURE: 100.7 F | HEIGHT: 67 IN | WEIGHT: 140 LBS | OXYGEN SATURATION: 95 %

## 2019-12-15 DIAGNOSIS — J11.1 INFLUENZA: Primary | ICD-10-CM

## 2019-12-15 LAB
FLUAV AG NPH QL: POSITIVE
FLUBV AG NPH QL IA: NEGATIVE

## 2019-12-15 PROCEDURE — 94640 AIRWAY INHALATION TREATMENT: CPT

## 2019-12-15 PROCEDURE — 99284 EMERGENCY DEPT VISIT MOD MDM: CPT

## 2019-12-15 PROCEDURE — 94799 UNLISTED PULMONARY SVC/PX: CPT

## 2019-12-15 PROCEDURE — 71046 X-RAY EXAM CHEST 2 VIEWS: CPT

## 2019-12-15 PROCEDURE — 87804 INFLUENZA ASSAY W/OPTIC: CPT

## 2019-12-15 RX ORDER — IBUPROFEN 800 MG/1
800 TABLET ORAL
Qty: 15 TABLET | Refills: 0 | Status: SHIPPED | OUTPATIENT
Start: 2019-12-15 | End: 2019-12-20

## 2019-12-15 RX ORDER — SODIUM CHLORIDE 0.9 % (FLUSH) 0.9 %
10 SYRINGE (ML) INJECTION AS NEEDED
Status: DISCONTINUED | OUTPATIENT
Start: 2019-12-15 | End: 2019-12-15 | Stop reason: HOSPADM

## 2019-12-15 RX ORDER — ALBUTEROL SULFATE 2.5 MG/3ML
2.5 SOLUTION RESPIRATORY (INHALATION) EVERY 6 HOURS PRN
Status: DISCONTINUED | OUTPATIENT
Start: 2019-12-15 | End: 2019-12-15 | Stop reason: HOSPADM

## 2019-12-15 RX ORDER — OSELTAMIVIR PHOSPHATE 75 MG/1
75 CAPSULE ORAL 2 TIMES DAILY
Qty: 10 CAPSULE | Refills: 0 | Status: SHIPPED | OUTPATIENT
Start: 2019-12-15 | End: 2019-12-20

## 2019-12-15 RX ORDER — DEXTROMETHORPHAN HYDROBROMIDE AND PROMETHAZINE HYDROCHLORIDE 15; 6.25 MG/5ML; MG/5ML
5 SYRUP ORAL 4 TIMES DAILY PRN
Qty: 140 ML | Refills: 0 | Status: SHIPPED | OUTPATIENT
Start: 2019-12-15 | End: 2019-12-22

## 2019-12-15 RX ADMIN — ALBUTEROL SULFATE 2.5 MG: 2.5 SOLUTION RESPIRATORY (INHALATION) at 20:29

## 2019-12-15 RX ADMIN — SODIUM CHLORIDE 1000 ML: 9 INJECTION, SOLUTION INTRAVENOUS at 20:26

## 2019-12-16 NOTE — ED PROVIDER NOTES
Subjective   35 yo female presents to ER with 2 day history of aches, coughing, and fever. Has taken Tylenol but not today. Also c/o decreased appetite and nausea. Denies vomiting          Review of Systems   Constitutional: Positive for appetite change, chills and fever.   HENT: Positive for sore throat. Negative for congestion.    Eyes: Positive for visual disturbance.   Respiratory: Positive for cough and shortness of breath.    Cardiovascular: Negative for chest pain.   Gastrointestinal: Positive for constipation, nausea and vomiting.   Genitourinary: Negative for difficulty urinating.   Musculoskeletal: Positive for myalgias.   Skin: Negative for color change.   Neurological: Negative for syncope.       Past Medical History:   Diagnosis Date   • Abdominal pain     RLQ   • Abdominal pain     RUQ   • Allergic rhinitis    • Alopecia    • Anxiety    • Asthma    • Bipolar 1 disorder (CMS/HCC)    • Chest pain    • Chronic abdominal pain    • Chronic diarrhea    • Chronic post-traumatic stress disorder    • Constipation    • Depressive disorder    • Dysmenorrhea     Suspect secondary to adenomyosis      • Dyspnea    • Epigastric pain    • Gastritis    • Generalized abdominal pain    • Generalized anxiety disorder    • Hematochezia    • IBS (irritable bowel syndrome)     Without diarrhea   • Nausea & vomiting    • Pain in pelvis    • Tachycardia        Allergies   Allergen Reactions   • Eggs Or Egg-Derived Products GI Intolerance   • Fish-Derived Products Diarrhea     V/D   • Milk-Related Compounds Diarrhea     V/d   • Other Diarrhea     peanuts       Past Surgical History:   Procedure Laterality Date   • COLONOSCOPY W/ POLYPECTOMY  04/17/2015    Internal and external hemorrhoids found   • ESOPHAGOSCOPY / EGD  04/17/2015    With biopsy-Normal esophagus. Gastritis was found in the stomach. Biopsy taken. Normal duodenum. Biopsy taken   • INJECTION OF MEDICATION  01/08/2014    Kenalog   • INJECTION OF MEDICATION   04/11/2014    Toradol   • OTHER SURGICAL HISTORY  11/26/2014    Gynecologic surgery-Exam under anesthesia, diagnostic hysteroscopy, fractional dilation and curettage, balloon thermal ablation   • TUBAL ABDOMINAL LIGATION  10/19/2011    with Filshie clips & removal of intrauterine device   • VAGINAL DELIVERY  2010    x3       Family History   Problem Relation Age of Onset   • Heart disease Other        Social History     Socioeconomic History   • Marital status: Single     Spouse name: Not on file   • Number of children: Not on file   • Years of education: Not on file   • Highest education level: Not on file   Tobacco Use   • Smoking status: Former Smoker   Substance and Sexual Activity   • Alcohol use: No   • Drug use: No           Objective   Physical Exam   Constitutional: She appears well-developed and well-nourished. No distress.   HENT:   Head: Normocephalic.   Eyes: Pupils are equal, round, and reactive to light. Right eye exhibits no discharge. Left eye exhibits no discharge.   Cardiovascular: Normal rate, regular rhythm and normal heart sounds.   Pulmonary/Chest: Effort normal. No respiratory distress. She has wheezes.   Abdominal: Soft. Bowel sounds are normal. There is tenderness.   Has Gastric stimulator placed Nov. 4th   Musculoskeletal: Normal range of motion. She exhibits no edema.   Skin: Skin is warm and dry. Capillary refill takes 2 to 3 seconds.   Psychiatric: She has a normal mood and affect.       Procedures           ED Course            Results for orders placed or performed during the hospital encounter of 12/15/19   Influenza Antigen, Rapid - Swab, Nasopharynx   Result Value Ref Range    Influenza A Ag, EIA Positive (A) Negative    Influenza B Ag, EIA Negative Negative                No data recorded                        MDM  Number of Diagnoses or Management Options  Influenza: new and requires workup  Diagnosis management comments: Influenza test positive.  Liter bolus given in the  emergency department.  Placed on Tamiflu, NSAIDs and cough syrup for rest.  Patient instructed that she is contagious       Amount and/or Complexity of Data Reviewed  Clinical lab tests: ordered and reviewed    Patient Progress  Patient progress: stable      Final diagnoses:   Influenza              Rubén Solitario, APRN  12/15/19 [id number]

## 2020-02-26 ENCOUNTER — OFFICE VISIT (OUTPATIENT)
Dept: FAMILY MEDICINE CLINIC | Facility: CLINIC | Age: 35
End: 2020-02-26

## 2020-02-26 VITALS
HEIGHT: 67 IN | DIASTOLIC BLOOD PRESSURE: 64 MMHG | WEIGHT: 140 LBS | TEMPERATURE: 98.7 F | SYSTOLIC BLOOD PRESSURE: 98 MMHG | BODY MASS INDEX: 21.97 KG/M2

## 2020-02-26 DIAGNOSIS — R51.9 NONINTRACTABLE HEADACHE, UNSPECIFIED CHRONICITY PATTERN, UNSPECIFIED HEADACHE TYPE: Primary | ICD-10-CM

## 2020-02-26 DIAGNOSIS — J02.0 ACUTE STREPTOCOCCAL PHARYNGITIS: ICD-10-CM

## 2020-02-26 PROCEDURE — 99213 OFFICE O/P EST LOW 20 MIN: CPT | Performed by: FAMILY MEDICINE

## 2020-02-26 RX ORDER — CEPHALEXIN 250 MG/5ML
POWDER, FOR SUSPENSION ORAL
Qty: 210 ML | Refills: 0 | Status: SHIPPED | OUTPATIENT
Start: 2020-02-26 | End: 2020-05-29

## 2020-02-26 NOTE — PROGRESS NOTES
Subjective   Hebert Olmos is a 34 y.o. female.     History of Present Illness   requesting evaluation 24-hour history of acute onset sore throat headache body aches fever.  Questionable exposure.  Is had influenza earlier in the year treated with Tamiflu.  Currently on medicines as listed.  HPI    The following portions of the patient's history were reviewed and updated as appropriate: allergies, current medications, past family history, past medical history, past social history, past surgical history and problem list.    Review of Systems  Review of Systems   Constitutional: Positive for fatigue. Negative for activity change, appetite change and unexpected weight change.   HENT: Positive for sore throat and trouble swallowing. Negative for voice change.    Eyes: Negative for redness and visual disturbance.   Respiratory: Negative for cough and wheezing.    Cardiovascular: Negative for chest pain and palpitations.   Gastrointestinal: Negative for abdominal pain, constipation, diarrhea, nausea and vomiting.   Genitourinary: Negative for urgency.   Musculoskeletal: Negative for joint swelling.   Neurological: Positive for headaches. Negative for syncope.   Hematological: Negative for adenopathy.   Psychiatric/Behavioral: Negative for sleep disturbance.       Objective   Physical Exam  Physical Exam   Constitutional: She is oriented to person, place, and time. She appears well-developed.   HENT:   Head: Normocephalic.   Right Ear: External ear normal.   Left Ear: External ear normal.   Nose: Nose normal.   Mouth/Throat: Oropharyngeal exudate (Petechiae across soft palate 1+ tonsils with early exudate bilaterally mild swelling.) and posterior oropharyngeal erythema present.   Eyes: Pupils are equal, round, and reactive to light.   Neck: Normal range of motion. No thyromegaly present.   Cardiovascular: Normal rate, regular rhythm, normal heart sounds and intact distal pulses. Exam reveals no gallop and no friction  "rub.   No murmur heard.  Pulmonary/Chest: Breath sounds normal.   Abdominal: Soft. She exhibits no distension and no mass. There is no tenderness.   Musculoskeletal: Normal range of motion.   Lymphadenopathy:     She has cervical adenopathy (Shotty anterior nodes no posterior nodes).   Neurological: She is alert and oriented to person, place, and time. She has normal reflexes.   Skin: Skin is warm and dry.   Psychiatric: She has a normal mood and affect.         Visit Vitals  BP 98/64 (BP Location: Left arm, Patient Position: Sitting, Cuff Size: Large Adult)   Temp 98.7 °F (37.1 °C) (Oral)   Ht 170.2 cm (67\")   Wt 63.5 kg (140 lb)   BMI 21.93 kg/m²     Body mass index is 21.93 kg/m².      Assessment/Plan   Hebert was seen today for sore throat and cough.    Diagnoses and all orders for this visit:    Nonintractable headache, unspecified chronicity pattern, unspecified headache type  -     cephALEXin (KEFLEX) 250 MG/5ML suspension; 2 tsp po tid x 7 days  -     ibuprofen (ADVIL,MOTRIN) 100 MG/5ML suspension; Take 20 mL by mouth Every 6 (Six) Hours As Needed for Mild Pain .    Acute streptococcal pharyngitis  -     cephALEXin (KEFLEX) 250 MG/5ML suspension; 2 tsp po tid x 7 days  -     ibuprofen (ADVIL,MOTRIN) 100 MG/5ML suspension; Take 20 mL by mouth Every 6 (Six) Hours As Needed for Mild Pain .    Suspect strep in the adult.  Counseled on gargles fluids clean air handwashing symptomatic relief short-term medication rechecks directed  "

## 2020-05-29 ENCOUNTER — OFFICE VISIT (OUTPATIENT)
Dept: FAMILY MEDICINE CLINIC | Facility: CLINIC | Age: 35
End: 2020-05-29

## 2020-05-29 VITALS
OXYGEN SATURATION: 99 % | HEART RATE: 83 BPM | WEIGHT: 158.2 LBS | BODY MASS INDEX: 24.83 KG/M2 | DIASTOLIC BLOOD PRESSURE: 74 MMHG | SYSTOLIC BLOOD PRESSURE: 122 MMHG | HEIGHT: 67 IN | TEMPERATURE: 98 F

## 2020-05-29 DIAGNOSIS — H53.149 PHOTOPHOBIA: ICD-10-CM

## 2020-05-29 DIAGNOSIS — Z97.3 WEARS CONTACT LENSES: ICD-10-CM

## 2020-05-29 DIAGNOSIS — S05.01XA ABRASION OF RIGHT CORNEA, INITIAL ENCOUNTER: ICD-10-CM

## 2020-05-29 DIAGNOSIS — H57.11 PAIN OF RIGHT EYE: Primary | ICD-10-CM

## 2020-05-29 PROCEDURE — 99213 OFFICE O/P EST LOW 20 MIN: CPT | Performed by: FAMILY MEDICINE

## 2020-05-29 RX ORDER — CIPROFLOXACIN HYDROCHLORIDE 3.5 MG/ML
SOLUTION/ DROPS TOPICAL
Qty: 10 ML | Refills: 1 | Status: SHIPPED | OUTPATIENT
Start: 2020-05-29 | End: 2021-03-18

## 2020-05-29 NOTE — PROGRESS NOTES
"Subjective   Hebert Olmos is a 34 y.o. female.     History of Present Illness requesting evaluation painful right I have occurred after getting off work this afternoon.  Wears contacts developing pain to the contact Octavio had pain on the right.  Been using a shield all day as well.  HPI    The following portions of the patient's history were reviewed and updated as appropriate: allergies, current medications, past family history, past medical history, past social history, past surgical history and problem list.    Review of Systems  Review of Systems   Constitutional: Negative for activity change, appetite change, fatigue and unexpected weight change.   HENT: Negative for trouble swallowing and voice change.    Eyes: Positive for photophobia, pain, redness and visual disturbance.   Respiratory: Negative for cough and wheezing.    Cardiovascular: Negative for chest pain and palpitations.   Gastrointestinal: Negative for abdominal pain, constipation, diarrhea, nausea and vomiting.   Genitourinary: Negative for urgency.   Musculoskeletal: Negative for joint swelling.   Neurological: Negative for syncope and headaches.   Hematological: Negative for adenopathy.   Psychiatric/Behavioral: Negative for sleep disturbance.       Objective   Physical Exam  Physical Exam   Constitutional: She appears well-developed.   HENT:   Head: Normocephalic.   Eyes: Pupils are equal, round, and reactive to light. EOM are normal.   Eyelid slightly swollen EOMs full minimal air inflammation anterior chamber appears clear there appears to be a possible early small defect in the cornea in the midline.  No other lesions are seen.  Some photophobia.  Contact is out.  Left side clear   Psychiatric:   Mild discomfort         Visit Vitals  /74 (BP Location: Left arm, Patient Position: Sitting)   Pulse 83   Temp 98 °F (36.7 °C) (Tympanic)   Ht 170.2 cm (67\")   Wt 71.8 kg (158 lb 3.2 oz)   SpO2 99%   BMI 24.78 kg/m²     Body mass index " is 24.78 kg/m².      Assessment/Plan   Hebert was seen today for eye pain.    Diagnoses and all orders for this visit:    Pain of right eye  -     ciprofloxacin (CILOXAN) 0.3 % ophthalmic solution; 1 drop bid x 5 days r eye  -     Glycerin-Hypromellose- (Artificial Tears) 0.2-0.2-1 % solution ophthalmic solution; Use q 2hr prn    Photophobia  -     ciprofloxacin (CILOXAN) 0.3 % ophthalmic solution; 1 drop bid x 5 days r eye  -     Glycerin-Hypromellose- (Artificial Tears) 0.2-0.2-1 % solution ophthalmic solution; Use q 2hr prn    Abrasion of right cornea, initial encounter  -     ciprofloxacin (CILOXAN) 0.3 % ophthalmic solution; 1 drop bid x 5 days r eye  -     Glycerin-Hypromellose- (Artificial Tears) 0.2-0.2-1 % solution ophthalmic solution; Use q 2hr prn    Wears contact lenses  -     ciprofloxacin (CILOXAN) 0.3 % ophthalmic solution; 1 drop bid x 5 days r eye  -     Glycerin-Hypromellose- (Artificial Tears) 0.2-0.2-1 % solution ophthalmic solution; Use q 2hr prn    Counseled on cool compresses darkened rooms ibuprofen around-the-clock for 24 hours drops as ordered especially with contact wearing recheck 2448 hrs. if not improved

## 2020-08-04 ENCOUNTER — OFFICE VISIT (OUTPATIENT)
Dept: FAMILY MEDICINE CLINIC | Facility: CLINIC | Age: 35
End: 2020-08-04

## 2020-08-04 VITALS
BODY MASS INDEX: 23.75 KG/M2 | DIASTOLIC BLOOD PRESSURE: 82 MMHG | HEIGHT: 67 IN | SYSTOLIC BLOOD PRESSURE: 130 MMHG | WEIGHT: 151.3 LBS

## 2020-08-04 DIAGNOSIS — R11.2 NAUSEA AND VOMITING, INTRACTABILITY OF VOMITING NOT SPECIFIED, UNSPECIFIED VOMITING TYPE: Primary | ICD-10-CM

## 2020-08-04 DIAGNOSIS — K50.00 CROHN'S DISEASE OF SMALL INTESTINE WITHOUT COMPLICATION (HCC): Chronic | ICD-10-CM

## 2020-08-04 PROCEDURE — 99213 OFFICE O/P EST LOW 20 MIN: CPT | Performed by: FAMILY MEDICINE

## 2020-08-04 RX ORDER — ONDANSETRON HYDROCHLORIDE 8 MG/1
8 TABLET, FILM COATED ORAL EVERY 8 HOURS PRN
Qty: 10 TABLET | Refills: 1 | OUTPATIENT
Start: 2020-08-04 | End: 2021-01-28

## 2020-08-04 NOTE — PROGRESS NOTES
Subjective   Hebert Olmos is a 35 y.o. female.  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  Developed vomiting x1 was sent home.  Currently is on scopolamine patch for chronic vomiting and nausea related to Crohn's disease.  No fever no chills no diarrhea no other symptoms feels well now.    History of Present Illness   HPI    The following portions of the patient's history were reviewed and updated as appropriate: allergies, current medications, past family history, past medical history, past social history, past surgical history and problem list.    Review of Systems  Review of Systems   Constitutional: Negative for activity change, appetite change, fatigue and unexpected weight change.   HENT: Negative for trouble swallowing and voice change.    Eyes: Negative for redness and visual disturbance.   Respiratory: Negative for cough and wheezing.    Cardiovascular: Negative for chest pain and palpitations.   Gastrointestinal: Positive for vomiting. Negative for abdominal pain, constipation, diarrhea and nausea.   Genitourinary: Negative for urgency.   Musculoskeletal: Negative for joint swelling.   Neurological: Negative for syncope and headaches.   Hematological: Negative for adenopathy.   Psychiatric/Behavioral: Negative for sleep disturbance.       Objective   Physical Exam  Physical Exam   Constitutional: She is oriented to person, place, and time. She appears well-developed.   HENT:   Head: Normocephalic.   Eyes: Pupils are equal, round, and reactive to light.   Neck: Normal range of motion. No thyromegaly present.   Cardiovascular: Normal rate, regular rhythm, normal heart sounds and intact distal pulses. Exam reveals no gallop and no friction rub.   No murmur heard.  Pulmonary/Chest: Breath sounds normal.   Abdominal: Soft. She exhibits no distension and no mass. There is no tenderness.   Musculoskeletal: Normal range of motion.   Neurological: She is alert and oriented to  "person, place, and time. She has normal reflexes.   Skin: Skin is warm and dry.   Psychiatric: She has a normal mood and affect. Her speech is normal and behavior is normal.   No distress         Visit Vitals  /82   Ht 170.2 cm (67\")   Wt 68.6 kg (151 lb 4.8 oz)   BMI 23.70 kg/m²     Body mass index is 23.7 kg/m².      Assessment/Plan   Hebert was seen today for vomiting and nausea.    Diagnoses and all orders for this visit:    Nausea and vomiting, intractability of vomiting not specified, unspecified vomiting type  -     ondansetron (ZOFRAN) 8 MG tablet; Take 1 tablet by mouth Every 8 (Eight) Hours As Needed for Nausea or Vomiting.    Crohn's disease of small intestine without complication (CMS/HCC)  -     ondansetron (ZOFRAN) 8 MG tablet; Take 1 tablet by mouth Every 8 (Eight) Hours As Needed for Nausea or Vomiting.    Hydration environmental control heat control etc. continue scopolamine deferred to her GI specialist supplement with Zofran as needed back to work tomorrow  "

## 2021-01-28 PROBLEM — J02.9 ACUTE PHARYNGITIS: Status: ACTIVE | Noted: 2021-01-28

## 2021-01-28 PROCEDURE — 87635 SARS-COV-2 COVID-19 AMP PRB: CPT | Performed by: NURSE PRACTITIONER

## 2021-01-29 ENCOUNTER — TRANSCRIBE ORDERS (OUTPATIENT)
Dept: URGENT CARE | Facility: CLINIC | Age: 36
End: 2021-01-29

## 2021-01-29 DIAGNOSIS — J02.9 ACUTE PHARYNGITIS, UNSPECIFIED ETIOLOGY: Primary | ICD-10-CM

## 2021-01-29 RX ORDER — AZITHROMYCIN 250 MG/1
TABLET, FILM COATED ORAL
Qty: 6 TABLET | Refills: 0 | Status: SHIPPED | OUTPATIENT
Start: 2021-01-29 | End: 2021-02-28

## 2021-02-28 ENCOUNTER — APPOINTMENT (OUTPATIENT)
Dept: GENERAL RADIOLOGY | Facility: HOSPITAL | Age: 36
End: 2021-02-28

## 2021-02-28 ENCOUNTER — APPOINTMENT (OUTPATIENT)
Dept: CT IMAGING | Facility: HOSPITAL | Age: 36
End: 2021-02-28

## 2021-02-28 ENCOUNTER — HOSPITAL ENCOUNTER (EMERGENCY)
Facility: HOSPITAL | Age: 36
Discharge: HOME | End: 2021-02-28
Attending: STUDENT IN AN ORGANIZED HEALTH CARE EDUCATION/TRAINING PROGRAM | Admitting: STUDENT IN AN ORGANIZED HEALTH CARE EDUCATION/TRAINING PROGRAM

## 2021-02-28 VITALS
WEIGHT: 157.2 LBS | OXYGEN SATURATION: 99 % | RESPIRATION RATE: 18 BRPM | BODY MASS INDEX: 24.67 KG/M2 | HEIGHT: 67 IN | SYSTOLIC BLOOD PRESSURE: 113 MMHG | HEART RATE: 66 BPM | DIASTOLIC BLOOD PRESSURE: 75 MMHG | TEMPERATURE: 97.7 F

## 2021-02-28 DIAGNOSIS — S02.2XXA CLOSED NONDISPLACED FRACTURE OF NASAL BONE, INITIAL ENCOUNTER: Primary | ICD-10-CM

## 2021-02-28 DIAGNOSIS — M25.512 ACUTE PAIN OF LEFT SHOULDER: ICD-10-CM

## 2021-02-28 DIAGNOSIS — R51.9 NONINTRACTABLE HEADACHE, UNSPECIFIED CHRONICITY PATTERN, UNSPECIFIED HEADACHE TYPE: ICD-10-CM

## 2021-02-28 DIAGNOSIS — M54.2 NECK PAIN: ICD-10-CM

## 2021-02-28 PROCEDURE — 70450 CT HEAD/BRAIN W/O DYE: CPT

## 2021-02-28 PROCEDURE — 72128 CT CHEST SPINE W/O DYE: CPT

## 2021-02-28 PROCEDURE — 99283 EMERGENCY DEPT VISIT LOW MDM: CPT

## 2021-02-28 PROCEDURE — 70486 CT MAXILLOFACIAL W/O DYE: CPT

## 2021-02-28 PROCEDURE — 72125 CT NECK SPINE W/O DYE: CPT

## 2021-02-28 PROCEDURE — 73030 X-RAY EXAM OF SHOULDER: CPT

## 2021-02-28 RX ORDER — NAPROXEN 500 MG/1
500 TABLET ORAL 2 TIMES DAILY WITH MEALS
Qty: 14 TABLET | Refills: 0 | Status: SHIPPED | OUTPATIENT
Start: 2021-02-28 | End: 2021-03-07

## 2021-02-28 RX ORDER — HYDROCODONE BITARTRATE AND ACETAMINOPHEN 5; 325 MG/1; MG/1
1 TABLET ORAL ONCE
Status: COMPLETED | OUTPATIENT
Start: 2021-02-28 | End: 2021-02-28

## 2021-02-28 RX ADMIN — HYDROCODONE BITARTRATE AND ACETAMINOPHEN 1 TABLET: 5; 325 TABLET ORAL at 12:38

## 2021-03-18 ENCOUNTER — OFFICE VISIT (OUTPATIENT)
Dept: FAMILY MEDICINE CLINIC | Facility: CLINIC | Age: 36
End: 2021-03-18

## 2021-03-18 VITALS
BODY MASS INDEX: 24.17 KG/M2 | SYSTOLIC BLOOD PRESSURE: 110 MMHG | DIASTOLIC BLOOD PRESSURE: 68 MMHG | HEIGHT: 67 IN | WEIGHT: 154 LBS

## 2021-03-18 DIAGNOSIS — R11.0 CHRONIC NAUSEA: Chronic | ICD-10-CM

## 2021-03-18 DIAGNOSIS — K31.84 GASTROPARALYSIS: Primary | Chronic | ICD-10-CM

## 2021-03-18 PROBLEM — J02.9 ACUTE PHARYNGITIS: Status: RESOLVED | Noted: 2021-01-28 | Resolved: 2021-03-18

## 2021-03-18 PROBLEM — K50.00 CROHN'S DISEASE OF SMALL INTESTINE WITHOUT COMPLICATION: Chronic | Status: RESOLVED | Noted: 2017-12-28 | Resolved: 2021-03-18

## 2021-03-18 PROCEDURE — 99213 OFFICE O/P EST LOW 20 MIN: CPT | Performed by: FAMILY MEDICINE

## 2021-03-18 NOTE — PROGRESS NOTES
Subjective   Hebert Olmos is a 35 y.o. female.  Requesting refill of scopolamine patches.  Has a gastric stimulator in place put in by providers and mobile.  Is been using same for chronic gastroparesis.  Been using chronic scopolamine patches per their request to help counteract nausea.  Patient requesting refill.  Only goes to [place] once a year to have the stimulator generator checked.  Medicine works well.  States does visit with her gynecologist routinely.  History noted.    History of Present Illness   HPI    The following portions of the patient's history were reviewed and updated as appropriate: allergies, current medications, past family history, past medical history, past social history, past surgical history and problem list.    Review of Systems  Review of Systems   Constitutional: Negative for activity change, appetite change, fatigue and unexpected weight change.   HENT: Negative for trouble swallowing and voice change.    Eyes: Negative for redness and visual disturbance.   Respiratory: Negative for cough and wheezing.    Cardiovascular: Negative for chest pain and palpitations.   Gastrointestinal: Positive for nausea (Controlled with medication is developing a slight tolerance to dosing). Negative for abdominal pain, constipation, diarrhea and vomiting.   Genitourinary: Negative for urgency.   Musculoskeletal: Negative for joint swelling.   Neurological: Negative for syncope and headaches.   Hematological: Negative for adenopathy.   Psychiatric/Behavioral: Negative for sleep disturbance.       Objective   Physical Exam  Physical Exam  Constitutional:       Appearance: She is well-developed.   HENT:      Head: Normocephalic.      Right Ear: External ear normal.   Neck:      Thyroid: No thyromegaly.   Cardiovascular:      Rate and Rhythm: Normal rate.      Heart sounds: No murmur heard.   No friction rub. No gallop.    Abdominal:      General: There is no distension.      Palpations: Abdomen  "is soft. There is no mass.      Tenderness: There is no abdominal tenderness.   Musculoskeletal:         General: Normal range of motion.      Cervical back: Normal range of motion.   Skin:     General: Skin is warm and dry.   Neurological:      Mental Status: She is alert and oriented to person, place, and time.      Deep Tendon Reflexes: Reflexes are normal and symmetric.   Psychiatric:         Mood and Affect: Mood normal.         Behavior: Behavior normal.         Thought Content: Thought content normal.         Judgment: Judgment normal.           Visit Vitals  /68   Ht 170.2 cm (67\")   Wt 69.9 kg (154 lb)   LMP  (LMP Unknown)   BMI 24.12 kg/m²     Body mass index is 24.12 kg/m².      Assessment/Plan   Diagnoses and all orders for this visit:    1. Gastroparalysis (Primary)  -     Transderm-Scop, 1.5 MG, 1.5 MG/3DAYS patch; 1 to skin q 2-3 days for nausea control  Dispense: 45 patch; Refill: 3    2. Chronic nausea  -     Transderm-Scop, 1.5 MG, 1.5 MG/3DAYS patch; 1 to skin q 2-3 days for nausea control  Dispense: 45 patch; Refill: 3    Counseled anticholinergic side effects long-term counseled on hydration medication orders as above recheck on medication yearly recheck level as directed  "

## 2021-04-08 ENCOUNTER — OFFICE VISIT (OUTPATIENT)
Dept: FAMILY MEDICINE CLINIC | Facility: CLINIC | Age: 36
End: 2021-04-08

## 2021-04-08 ENCOUNTER — LAB (OUTPATIENT)
Dept: LAB | Facility: HOSPITAL | Age: 36
End: 2021-04-08

## 2021-04-08 VITALS
HEIGHT: 67 IN | WEIGHT: 154 LBS | DIASTOLIC BLOOD PRESSURE: 80 MMHG | BODY MASS INDEX: 24.17 KG/M2 | SYSTOLIC BLOOD PRESSURE: 120 MMHG

## 2021-04-08 DIAGNOSIS — K31.84 GASTROPARALYSIS: Chronic | ICD-10-CM

## 2021-04-08 DIAGNOSIS — R11.0 CHRONIC NAUSEA: Chronic | ICD-10-CM

## 2021-04-08 DIAGNOSIS — Z11.59 NEED FOR HEPATITIS C SCREENING TEST: ICD-10-CM

## 2021-04-08 DIAGNOSIS — R53.81 MALAISE: ICD-10-CM

## 2021-04-08 DIAGNOSIS — R53.83 FATIGUE, UNSPECIFIED TYPE: Primary | ICD-10-CM

## 2021-04-08 PROCEDURE — 83540 ASSAY OF IRON: CPT | Performed by: FAMILY MEDICINE

## 2021-04-08 PROCEDURE — 85027 COMPLETE CBC AUTOMATED: CPT | Performed by: FAMILY MEDICINE

## 2021-04-08 PROCEDURE — 82607 VITAMIN B-12: CPT | Performed by: FAMILY MEDICINE

## 2021-04-08 PROCEDURE — 84443 ASSAY THYROID STIM HORMONE: CPT | Performed by: FAMILY MEDICINE

## 2021-04-08 PROCEDURE — 36415 COLL VENOUS BLD VENIPUNCTURE: CPT | Performed by: FAMILY MEDICINE

## 2021-04-08 PROCEDURE — 86803 HEPATITIS C AB TEST: CPT | Performed by: FAMILY MEDICINE

## 2021-04-08 PROCEDURE — 99214 OFFICE O/P EST MOD 30 MIN: CPT | Performed by: FAMILY MEDICINE

## 2021-04-08 PROCEDURE — 80053 COMPREHEN METABOLIC PANEL: CPT | Performed by: FAMILY MEDICINE

## 2021-04-08 PROCEDURE — 84439 ASSAY OF FREE THYROXINE: CPT | Performed by: FAMILY MEDICINE

## 2021-04-08 NOTE — PROGRESS NOTES
Subjective   Hbeert Olmos is a 35 y.o. female.  Requesting evaluation of increasing malaise fatigue.  Has been to visit her gastroenterologist in [place] had her gastric stimulator increased in intensity her nausea is improved.  Continues however to also use scopolamine patch.  States over the past several weeks is increasing early morning malaise fatigue increased drowsiness.  Mild for cold intolerance which is multifactorial.  Has never had a problem with same in the past.  No weight loss or weight gain.  Vital signs have remained normal.  History noted.    History of Present Illness   HPI    The following portions of the patient's history were reviewed and updated as appropriate: allergies, current medications, past family history, past medical history, past social history, past surgical history and problem list.    Review of Systems  Review of Systems   Constitutional: Positive for fatigue. Negative for activity change, appetite change and unexpected weight change.   HENT: Negative for trouble swallowing and voice change.    Eyes: Negative for redness and visual disturbance.   Respiratory: Negative for cough and wheezing.    Cardiovascular: Negative for chest pain and palpitations.   Gastrointestinal: Negative for abdominal pain, constipation, diarrhea, nausea and vomiting.   Endocrine: Positive for cold intolerance.   Genitourinary: Negative for urgency.   Musculoskeletal: Negative for joint swelling.   Neurological: Positive for weakness. Negative for syncope and headaches.   Hematological: Negative for adenopathy.   Psychiatric/Behavioral: Negative for sleep disturbance.       Objective   Physical Exam  Physical Exam  Constitutional:       Appearance: She is well-developed.   HENT:      Head: Normocephalic.   Eyes:      Pupils: Pupils are equal, round, and reactive to light.   Neck:      Thyroid: No thyromegaly.   Cardiovascular:      Rate and Rhythm: Normal rate and regular rhythm.      Heart  "sounds: Normal heart sounds. No murmur heard.   No friction rub. No gallop.    Pulmonary:      Breath sounds: Normal breath sounds.   Abdominal:      General: There is no distension.      Palpations: Abdomen is soft. There is no mass.      Tenderness: There is no abdominal tenderness.   Musculoskeletal:         General: Normal range of motion.      Cervical back: Normal range of motion.      Comments: Get up and go 3 to 5 seconds no joint abnormality   Skin:     General: Skin is warm and dry.   Neurological:      Mental Status: She is alert and oriented to person, place, and time.      Deep Tendon Reflexes: Reflexes are normal and symmetric.   Psychiatric:         Attention and Perception: Attention normal.         Mood and Affect: Mood normal.         Speech: Speech normal.         Behavior: Behavior normal.      Comments: Normal affect           Visit Vitals  /80   Ht 170.2 cm (67\")   Wt 69.9 kg (154 lb)   BMI 24.12 kg/m²     Body mass index is 24.12 kg/m².      Assessment/Plan   Diagnoses and all orders for this visit:    1. Fatigue, unspecified type (Primary)  -     TSH  -     T4, Free  -     Vitamin B12  -     Iron  -     CBC (No Diff)  -     Comprehensive Metabolic Panel    2. Malaise  -     TSH  -     T4, Free  -     Vitamin B12  -     Iron  -     CBC (No Diff)  -     Comprehensive Metabolic Panel    3. Chronic nausea  -     Vitamin B12  -     Iron    4. Gastroparalysis  -     Vitamin B12  -     Iron    Does have some risk of possible gastric absorption issues B12 iron etc.  Has maintained weight however.  We have counseled continuing all medicines continuing gastric stimulator etc.  Lab work ordered.  We will follow-up based on results.  History noted.  "

## 2021-04-09 ENCOUNTER — TELEPHONE (OUTPATIENT)
Dept: FAMILY MEDICINE CLINIC | Facility: CLINIC | Age: 36
End: 2021-04-09

## 2021-04-09 LAB
ALBUMIN SERPL-MCNC: 4.2 G/DL (ref 3.5–5.2)
ALBUMIN/GLOB SERPL: 1.4 G/DL
ALP SERPL-CCNC: 50 U/L (ref 39–117)
ALT SERPL W P-5'-P-CCNC: 13 U/L (ref 1–33)
ANION GAP SERPL CALCULATED.3IONS-SCNC: 11.3 MMOL/L (ref 5–15)
AST SERPL-CCNC: 16 U/L (ref 1–32)
BILIRUB SERPL-MCNC: 0.4 MG/DL (ref 0–1.2)
BUN SERPL-MCNC: 12 MG/DL (ref 6–20)
BUN/CREAT SERPL: 15.6 (ref 7–25)
CALCIUM SPEC-SCNC: 9 MG/DL (ref 8.6–10.5)
CHLORIDE SERPL-SCNC: 104 MMOL/L (ref 98–107)
CO2 SERPL-SCNC: 24.7 MMOL/L (ref 22–29)
CREAT SERPL-MCNC: 0.77 MG/DL (ref 0.57–1)
DEPRECATED RDW RBC AUTO: 44.6 FL (ref 37–54)
ERYTHROCYTE [DISTWIDTH] IN BLOOD BY AUTOMATED COUNT: 12.9 % (ref 12.3–15.4)
GFR SERPL CREATININE-BSD FRML MDRD: 102 ML/MIN/1.73
GLOBULIN UR ELPH-MCNC: 3.1 GM/DL
GLUCOSE SERPL-MCNC: 75 MG/DL (ref 65–99)
HCT VFR BLD AUTO: 40.6 % (ref 34–46.6)
HCV AB SER DONR QL: NORMAL
HGB BLD-MCNC: 13.4 G/DL (ref 12–15.9)
IRON 24H UR-MRATE: 118 MCG/DL (ref 37–145)
MCH RBC QN AUTO: 31.4 PG (ref 26.6–33)
MCHC RBC AUTO-ENTMCNC: 33 G/DL (ref 31.5–35.7)
MCV RBC AUTO: 95.1 FL (ref 79–97)
PLATELET # BLD AUTO: 160 10*3/MM3 (ref 140–450)
PMV BLD AUTO: 10.8 FL (ref 6–12)
POTASSIUM SERPL-SCNC: 4.1 MMOL/L (ref 3.5–5.2)
PROT SERPL-MCNC: 7.3 G/DL (ref 6–8.5)
RBC # BLD AUTO: 4.27 10*6/MM3 (ref 3.77–5.28)
SODIUM SERPL-SCNC: 140 MMOL/L (ref 136–145)
T4 FREE SERPL-MCNC: 0.95 NG/DL (ref 0.93–1.7)
TSH SERPL DL<=0.05 MIU/L-ACNC: 0.87 UIU/ML (ref 0.27–4.2)
VIT B12 BLD-MCNC: 477 PG/ML (ref 211–946)
WBC # BLD AUTO: 5.24 10*3/MM3 (ref 3.4–10.8)

## 2021-05-27 ENCOUNTER — OFFICE VISIT (OUTPATIENT)
Dept: FAMILY MEDICINE CLINIC | Facility: CLINIC | Age: 36
End: 2021-05-27

## 2021-05-27 VITALS
DIASTOLIC BLOOD PRESSURE: 68 MMHG | HEIGHT: 67 IN | WEIGHT: 144.4 LBS | SYSTOLIC BLOOD PRESSURE: 110 MMHG | BODY MASS INDEX: 22.66 KG/M2

## 2021-05-27 DIAGNOSIS — R19.7 NAUSEA VOMITING AND DIARRHEA: Primary | ICD-10-CM

## 2021-05-27 DIAGNOSIS — R11.2 NAUSEA VOMITING AND DIARRHEA: Primary | ICD-10-CM

## 2021-05-27 DIAGNOSIS — K52.9 ENTERITIS: ICD-10-CM

## 2021-05-27 DIAGNOSIS — R68.83 CHILLS: ICD-10-CM

## 2021-05-27 PROCEDURE — 99213 OFFICE O/P EST LOW 20 MIN: CPT | Performed by: FAMILY MEDICINE

## 2021-05-27 RX ORDER — DICYCLOMINE HYDROCHLORIDE 10 MG/1
CAPSULE ORAL
Qty: 20 CAPSULE | Refills: 1 | Status: SHIPPED | OUTPATIENT
Start: 2021-05-27

## 2021-05-27 NOTE — PROGRESS NOTES
Subjective   Hebert Olmos is a 35 y.o. female.  Requesting evaluation to 3-day history of nausea and diarrhea chills cramping.  Previous history of gastroparesis with stimulator noted.  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  Has developed basically standard symptoms people get on for a few days the job there.  Probably  Campylobacter but needs to be proven.  History noted.    History of Present Illness   HPI    The following portions of the patient's history were reviewed and updated as appropriate: allergies, current medications, past family history, past medical history, past social history, past surgical history and problem list.    Review of Systems  Review of Systems   Constitutional: Positive for chills. Negative for activity change, appetite change, fatigue and unexpected weight change.   HENT: Negative for trouble swallowing and voice change.    Eyes: Negative for redness and visual disturbance.   Respiratory: Negative for cough and wheezing.    Cardiovascular: Negative for chest pain and palpitations.   Gastrointestinal: Positive for diarrhea, nausea and vomiting. Negative for abdominal pain and constipation.   Genitourinary: Negative for urgency.   Musculoskeletal: Negative for joint swelling.   Neurological: Negative for syncope and headaches.   Hematological: Negative for adenopathy.   Psychiatric/Behavioral: Negative for sleep disturbance.       Objective   Physical Exam  Physical Exam  Constitutional:       Appearance: She is well-developed.   HENT:      Head: Normocephalic.   Eyes:      Pupils: Pupils are equal, round, and reactive to light.   Neck:      Thyroid: No thyromegaly.   Cardiovascular:      Rate and Rhythm: Normal rate and regular rhythm.      Heart sounds: Normal heart sounds. No murmur heard.   No friction rub. No gallop.    Pulmonary:      Breath sounds: Normal breath sounds.   Abdominal:      General: There is no distension.       "Palpations: Abdomen is soft. There is no mass.      Tenderness: There is no abdominal tenderness. There is no right CVA tenderness, left CVA tenderness, guarding or rebound.      Hernia: No hernia is present.   Musculoskeletal:         General: Normal range of motion.      Cervical back: Normal range of motion.   Skin:     General: Skin is warm and dry.   Neurological:      Mental Status: She is alert and oriented to person, place, and time.      Deep Tendon Reflexes: Reflexes are normal and symmetric.   Psychiatric:      Comments: No distress           Visit Vitals  /68   Ht 170.2 cm (67\")   Wt 65.5 kg (144 lb 6.4 oz)   BMI 22.62 kg/m²     Body mass index is 22.62 kg/m².  /68   Ht 170.2 cm (67\")   Wt 65.5 kg (144 lb 6.4 oz)   BMI 22.62 kg/m²       Assessment/Plan   Diagnoses and all orders for this visit:    1. Nausea vomiting and diarrhea (Primary)  -     Gastrointestinal Panel, PCR - Stool, Per Rectum  -     dicyclomine (BENTYL) 10 MG capsule; 1 tid to qid prn cramps and diarrhea  Dispense: 20 capsule; Refill: 1    2. Enteritis  -     Gastrointestinal Panel, PCR - Stool, Per Rectum  -     dicyclomine (BENTYL) 10 MG capsule; 1 tid to qid prn cramps and diarrhea  Dispense: 20 capsule; Refill: 1    3. Chills  -     Gastrointestinal Panel, PCR - Stool, Per Rectum  -     dicyclomine (BENTYL) 10 MG capsule; 1 tid to qid prn cramps and diarrhea  Dispense: 20 capsule; Refill: 1     handwashing fluids hydration mechanisms stool study symptomatic relief off work for next 2 to 3 days treatment based on results  "

## 2021-05-30 LAB
ADV 40+41 DNA STL QL NAA+NON-PROBE: NOT DETECTED
ASTRO TYP 1-8 RNA STL QL NAA+NON-PROBE: NOT DETECTED
C CAYETANENSIS DNA STL QL NAA+NON-PROBE: NOT DETECTED
C COLI+JEJ+UPSA DNA STL QL NAA+NON-PROBE: NOT DETECTED
CRYPTOSP DNA STL QL NAA+NON-PROBE: DETECTED
E HISTOLYT DNA STL QL NAA+NON-PROBE: NOT DETECTED
EAEC PAA PLAS AGGR+AATA ST NAA+NON-PRB: NOT DETECTED
EC STX1+STX2 GENES STL QL NAA+NON-PROBE: NOT DETECTED
EPEC EAE GENE STL QL NAA+NON-PROBE: NOT DETECTED
ETEC LTA+ST1A+ST1B TOX ST NAA+NON-PROBE: NOT DETECTED
G LAMBLIA DNA STL QL NAA+NON-PROBE: NOT DETECTED
NOROVIRUS GI+II RNA STL QL NAA+NON-PROBE: NOT DETECTED
P SHIGELLOIDES DNA STL QL NAA+NON-PROBE: NOT DETECTED
RVA RNA STL QL NAA+NON-PROBE: NOT DETECTED
S ENT+BONG DNA STL QL NAA+NON-PROBE: NOT DETECTED
SAPO I+II+IV+V RNA STL QL NAA+NON-PROBE: NOT DETECTED
SHIGELLA SP+EIEC IPAH ST NAA+NON-PROBE: NOT DETECTED
V CHOL+PARA+VUL DNA STL QL NAA+NON-PROBE: NOT DETECTED
V CHOLERAE DNA STL QL NAA+NON-PROBE: NOT DETECTED
Y ENTEROCOL DNA STL QL NAA+NON-PROBE: NOT DETECTED

## 2021-05-30 PROCEDURE — 0097U HC BIOFIRE FILMARRAY GI PANEL: CPT | Performed by: FAMILY MEDICINE

## 2021-05-31 NOTE — PROGRESS NOTES
Stool   positive for cryptospordium.  Not from chickens.  Not the one I was suspecting.  Ususally goes away on own.  If still with symptoms let us know for further rx

## 2021-06-02 DIAGNOSIS — A07.2 CRYPTOSPORIDIAL GASTROENTERITIS (HCC): Primary | ICD-10-CM

## 2021-06-02 RX ORDER — NITAZOXANIDE 500 MG/1
TABLET ORAL
Qty: 6 TABLET | Refills: 1 | OUTPATIENT
Start: 2021-06-02 | End: 2022-08-23

## 2022-02-09 PROCEDURE — U0003 INFECTIOUS AGENT DETECTION BY NUCLEIC ACID (DNA OR RNA); SEVERE ACUTE RESPIRATORY SYNDROME CORONAVIRUS 2 (SARS-COV-2) (CORONAVIRUS DISEASE [COVID-19]), AMPLIFIED PROBE TECHNIQUE, MAKING USE OF HIGH THROUGHPUT TECHNOLOGIES AS DESCRIBED BY CMS-2020-01-R: HCPCS | Performed by: NURSE PRACTITIONER

## 2022-08-23 PROCEDURE — 87635 SARS-COV-2 COVID-19 AMP PRB: CPT | Performed by: FAMILY MEDICINE

## 2022-10-05 ENCOUNTER — OFFICE VISIT (OUTPATIENT)
Dept: FAMILY MEDICINE CLINIC | Facility: CLINIC | Age: 37
End: 2022-10-05

## 2022-10-05 VITALS
DIASTOLIC BLOOD PRESSURE: 68 MMHG | SYSTOLIC BLOOD PRESSURE: 120 MMHG | WEIGHT: 160.06 LBS | HEIGHT: 67 IN | BODY MASS INDEX: 25.12 KG/M2

## 2022-10-05 DIAGNOSIS — F43.12 CHRONIC POST-TRAUMATIC STRESS DISORDER: Chronic | ICD-10-CM

## 2022-10-05 DIAGNOSIS — F41.9 ANXIETY: Chronic | ICD-10-CM

## 2022-10-05 DIAGNOSIS — S61.411D LACERATION OF RIGHT HAND WITHOUT FOREIGN BODY, SUBSEQUENT ENCOUNTER: ICD-10-CM

## 2022-10-05 DIAGNOSIS — F32.A DEPRESSIVE DISORDER: Chronic | ICD-10-CM
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
## 2022-10-05 PROCEDURE — 99214 OFFICE O/P EST MOD 30 MIN: CPT | Performed by: FAMILY MEDICINE
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
## 2022-10-11 ENCOUNTER — OFFICE VISIT (OUTPATIENT)
Dept: FAMILY MEDICINE CLINIC | Facility: CLINIC | Age: 37
End: 2022-10-11

## 2022-10-11 VITALS
BODY MASS INDEX: 25.54 KG/M2 | HEIGHT: 67 IN | DIASTOLIC BLOOD PRESSURE: 68 MMHG | WEIGHT: 162.7 LBS | SYSTOLIC BLOOD PRESSURE: 122 MMHG

## 2022-10-11 DIAGNOSIS — Z48.02 VISIT FOR SUTURE REMOVAL: ICD-10-CM

## 2022-10-11 DIAGNOSIS — S61.509D: Primary | ICD-10-CM

## 2022-10-11 DIAGNOSIS — M25.539 PAIN IN WRIST, UNSPECIFIED LATERALITY: ICD-10-CM

## 2022-10-11 PROCEDURE — 99212 OFFICE O/P EST SF 10 MIN: CPT | Performed by: FAMILY MEDICINE

## 2022-10-11 NOTE — PROGRESS NOTES
"Subjective   Hebert Olmos is a 37 y.o. female.  Reevaluation previous visit.  Discussed arrangements made for behavioral health.  Sutures are to be removed on wrist as mentioned.  The wrist wound is well-healed still having some stiffness and difficulty flexing the wrist at work because of the location of the suture line.    History of Present Illness   HPI    The following portions of the patient's history were reviewed and updated as appropriate: allergies, current medications, past family history, past medical history, past social history, past surgical history and problem list.    Review of Systems  Review of Systems   Constitutional: Negative for activity change, appetite change, fatigue and unexpected weight change.   HENT: Negative for trouble swallowing and voice change.    Eyes: Negative for redness and visual disturbance.   Respiratory: Negative for cough and wheezing.    Cardiovascular: Negative for chest pain and palpitations.   Gastrointestinal: Negative for abdominal pain, constipation, diarrhea, nausea and vomiting.   Genitourinary: Negative for urgency.   Musculoskeletal: Positive for arthralgias. Negative for joint swelling.   Skin: Positive for wound.   Neurological: Negative for syncope and headaches.   Hematological: Negative for adenopathy.   Psychiatric/Behavioral: Negative for sleep disturbance.       Objective   Physical Exam  Physical Exam  Musculoskeletal:      Comments: Above-mentioned wrist shows a vertical wound in the palm and wrist junction.  Stitches are removed.  The overlying skin is well-healed.   Neurological:      Mental Status: She is alert.     /68   Ht 170.2 cm (67\")   Wt 73.8 kg (162 lb 11.2 oz)   LMP  (LMP Unknown)   BMI 25.48 kg/m²         Visit Vitals  /68   Ht 170.2 cm (67\")   Wt 73.8 kg (162 lb 11.2 oz)   LMP  (LMP Unknown)   BMI 25.48 kg/m²     Body mass index is 25.48 kg/m².      Assessment/Plan   Diagnoses and all orders for this visit:    1. " Open wound of wrist, unspecified laterality, subsequent encounter (Primary)    2. Visit for suture removal    3. Pain in wrist, unspecified laterality     routine bathing routine soap and water no manipulation.  Can use a soft Proflex splint with a cardboard splint at the area to keep from bending it while working for a few days.  Observation rechecks direct

## 2022-10-20 ENCOUNTER — TELEPHONE (OUTPATIENT)
Dept: FAMILY MEDICINE CLINIC | Facility: CLINIC | Age: 37
End: 2022-10-20

## 2022-10-20 DIAGNOSIS — S61.509S: Primary | ICD-10-CM

## 2022-10-20 NOTE — TELEPHONE ENCOUNTER
Pt needs a new script for a hand brace for the right hand was given to pt at [facility] but she has lost it can it be called in at [facility]   Pt [phone]

## 2022-10-26 ENCOUNTER — TELEPHONE (OUTPATIENT)
Dept: PSYCHIATRY | Facility: CLINIC | Age: 37
End: 2022-10-26

## 2022-10-27 ENCOUNTER — TELEPHONE (OUTPATIENT)
Dept: PSYCHIATRY | Facility: CLINIC | Age: 37
End: 2022-10-27

## 2022-11-22 ENCOUNTER — OFFICE VISIT (OUTPATIENT)
Dept: FAMILY MEDICINE CLINIC | Facility: CLINIC | Age: 37
End: 2022-11-22

## 2022-11-22 VITALS
DIASTOLIC BLOOD PRESSURE: 70 MMHG | HEIGHT: 67 IN | WEIGHT: 163.1 LBS | SYSTOLIC BLOOD PRESSURE: 122 MMHG | BODY MASS INDEX: 25.6 KG/M2

## 2022-11-22 DIAGNOSIS — Z23 NEED FOR VACCINATION: Primary | ICD-10-CM
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
## 2022-11-22 DIAGNOSIS — M25.531 WRIST PAIN, RIGHT: ICD-10-CM

## 2022-11-22 DIAGNOSIS — M54.2 NECK PAIN: ICD-10-CM

## 2022-11-22 PROCEDURE — 90686 IIV4 VACC NO PRSV 0.5 ML IM: CPT | Performed by: FAMILY MEDICINE

## 2022-11-22 PROCEDURE — 99213 OFFICE O/P EST LOW 20 MIN: CPT | Performed by: FAMILY MEDICINE

## 2022-11-22 PROCEDURE — 90471 IMMUNIZATION ADMIN: CPT | Performed by: FAMILY MEDICINE
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
## 2022-11-23 ENCOUNTER — TELEPHONE (OUTPATIENT)
Dept: FAMILY MEDICINE CLINIC | Facility: CLINIC | Age: 37
End: 2022-11-23

## 2022-11-23 NOTE — TELEPHONE ENCOUNTER
Pt needs another referral as they don't deal with the neck ortho is seeing her for the hand but pt also discussed her neck and her neck is like a constant creak in the neck and Dr Reed told her the name of it but can't remember   Pt [phone]

## 2022-11-30 DIAGNOSIS — M25.531 RIGHT WRIST PAIN: Primary | ICD-10-CM

## 2022-12-01 DIAGNOSIS — R11.0 CHRONIC NAUSEA: Chronic | ICD-10-CM

## 2022-12-01 DIAGNOSIS — K31.84 GASTROPARALYSIS: Chronic | ICD-10-CM

## 2022-12-01 RX ORDER — SCOLOPAMINE TRANSDERMAL SYSTEM 1 MG/1
PATCH, EXTENDED RELEASE TRANSDERMAL
Qty: 45 PATCH | Refills: 3 | Status: SHIPPED | OUTPATIENT
Start: 2022-12-01 | End: 2023-03-07 | Stop reason: SDUPTHER

## 2022-12-02 ENCOUNTER — OFFICE VISIT (OUTPATIENT)
Dept: ORTHOPEDIC SURGERY | Facility: CLINIC | Age: 37
End: 2022-12-02

## 2022-12-02 VITALS — BODY MASS INDEX: 25.49 KG/M2 | HEIGHT: 67 IN | WEIGHT: 162.4 LBS

## 2022-12-02 DIAGNOSIS — M25.531 RIGHT WRIST PAIN: Primary | ICD-10-CM

## 2022-12-02 DIAGNOSIS — R20.2 NUMBNESS AND TINGLING IN RIGHT HAND: ICD-10-CM

## 2022-12-02 DIAGNOSIS — R20.0 NUMBNESS AND TINGLING IN RIGHT HAND: ICD-10-CM

## 2022-12-02 DIAGNOSIS — Z87.828 HISTORY OF OPEN HAND WOUND: ICD-10-CM

## 2022-12-02 PROCEDURE — 99214 OFFICE O/P EST MOD 30 MIN: CPT | Performed by: NURSE PRACTITIONER

## 2022-12-02 RX ORDER — MELOXICAM 7.5 MG/1
7.5 TABLET ORAL DAILY
Qty: 60 TABLET | Refills: 0 | Status: SHIPPED | OUTPATIENT
Start: 2022-12-02 | End: 2023-01-31
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
## 2022-12-27 ENCOUNTER — HOSPITAL ENCOUNTER (OUTPATIENT)
Dept: NEUROLOGY | Facility: HOSPITAL | Age: 37
Discharge: HOME | End: 2022-12-27
Admitting: NURSE PRACTITIONER

## 2022-12-27 DIAGNOSIS — R20.0 NUMBNESS AND TINGLING IN RIGHT HAND: ICD-10-CM

## 2022-12-27 DIAGNOSIS — M25.531 RIGHT WRIST PAIN: ICD-10-CM

## 2022-12-27 DIAGNOSIS — R20.2 NUMBNESS AND TINGLING IN RIGHT HAND: ICD-10-CM

## 2022-12-27 DIAGNOSIS — Z87.828 HISTORY OF OPEN HAND WOUND: ICD-10-CM

## 2022-12-27 PROCEDURE — 95886 MUSC TEST DONE W/N TEST COMP: CPT

## 2022-12-27 PROCEDURE — 95909 NRV CNDJ TST 5-6 STUDIES: CPT

## 2023-01-04 ENCOUNTER — TELEPHONE (OUTPATIENT)
Dept: ORTHOPEDIC SURGERY | Facility: CLINIC | Age: 38
End: 2023-01-04
Payer: COMMERCIAL

## 2023-01-04 NOTE — TELEPHONE ENCOUNTER
----- Message from YELENA Blankenship sent at 1/3/2023 12:17 PM CST -----  Please call patient and advise her EMG-NCS is normal. She can make a follow up to discuss results or other treatment options if she is still having symptoms.    ThanksJaimee

## 2023-01-10 DIAGNOSIS — R20.0 NUMBNESS AND TINGLING IN RIGHT HAND: Primary | ICD-10-CM

## 2023-01-10 DIAGNOSIS — M25.531 RIGHT WRIST PAIN: ICD-10-CM

## 2023-01-10 DIAGNOSIS — R20.2 NUMBNESS AND TINGLING IN RIGHT HAND: Primary | ICD-10-CM

## 2023-01-23 ENCOUNTER — OFFICE VISIT (OUTPATIENT)
Dept: ORTHOPEDIC SURGERY | Facility: CLINIC | Age: 38
End: 2023-01-23
Payer: COMMERCIAL

## 2023-01-23 VITALS — BODY MASS INDEX: 26.53 KG/M2 | HEIGHT: 67 IN | WEIGHT: 169 LBS

## 2023-01-23 DIAGNOSIS — Z87.828 HISTORY OF OPEN HAND WOUND: ICD-10-CM

## 2023-01-23 DIAGNOSIS — R20.0 NUMBNESS AND TINGLING IN RIGHT HAND: Primary | ICD-10-CM

## 2023-01-23 DIAGNOSIS — M25.531 RIGHT WRIST PAIN: ICD-10-CM

## 2023-01-23 DIAGNOSIS — M54.12 CERVICAL RADICULOPATHY: ICD-10-CM

## 2023-01-23 DIAGNOSIS — R20.2 NUMBNESS AND TINGLING IN RIGHT HAND: Primary | ICD-10-CM

## 2023-01-23 PROCEDURE — 99213 OFFICE O/P EST LOW 20 MIN: CPT | Performed by: SPECIALIST/TECHNOLOGIST, OTHER

## 2023-01-23 RX ORDER — LANOLIN ALCOHOL/MO/W.PET/CERES
1000 CREAM (GRAM) TOPICAL DAILY
Qty: 30 TABLET | Refills: 0 | Status: SHIPPED | OUTPATIENT
Start: 2023-01-23

## 2023-01-23 RX ORDER — GABAPENTIN 300 MG/1
300 CAPSULE ORAL DAILY
Qty: 30 CAPSULE | Refills: 0 | Status: SHIPPED | OUTPATIENT
Start: 2023-01-23
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
## 2023-02-15 ENCOUNTER — HOSPITAL ENCOUNTER (OUTPATIENT)
Dept: PHYSICAL THERAPY | Facility: HOSPITAL | Age: 38
Setting detail: THERAPIES SERIES
Discharge: HOME | End: 2023-02-15
Payer: COMMERCIAL

## 2023-02-15 DIAGNOSIS — Z87.828 HISTORY OF OPEN HAND WOUND: ICD-10-CM

## 2023-02-15 DIAGNOSIS — R20.0 NUMBNESS AND TINGLING IN RIGHT HAND: Primary | ICD-10-CM

## 2023-02-15 DIAGNOSIS — R20.2 NUMBNESS AND TINGLING IN RIGHT HAND: Primary | ICD-10-CM

## 2023-02-15 DIAGNOSIS — M25.531 RIGHT WRIST PAIN: ICD-10-CM

## 2023-02-15 PROCEDURE — 97162 PT EVAL MOD COMPLEX 30 MIN: CPT
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
## 2023-03-07 PROBLEM — B34.9 VIRAL ILLNESS: Status: ACTIVE | Noted: 2023-03-07

## 2023-05-01 ENCOUNTER — OFFICE VISIT (OUTPATIENT)
Dept: NEUROLOGY | Facility: CLINIC | Age: 38
End: 2023-05-01
Payer: COMMERCIAL

## 2023-05-01 VITALS
SYSTOLIC BLOOD PRESSURE: 120 MMHG | HEIGHT: 67 IN | DIASTOLIC BLOOD PRESSURE: 78 MMHG | BODY MASS INDEX: 23.86 KG/M2 | WEIGHT: 152 LBS

## 2023-05-01 DIAGNOSIS — R20.0 NUMBNESS AND TINGLING IN RIGHT HAND: Primary | ICD-10-CM

## 2023-05-01 DIAGNOSIS — M54.2 NECK PAIN: ICD-10-CM

## 2023-05-01 DIAGNOSIS — R20.2 NUMBNESS AND TINGLING IN RIGHT HAND: Primary | ICD-10-CM

## 2023-05-01 DIAGNOSIS — R20.0 NUMBNESS IN CERVICAL DERMATOME DISTRIBUTION: ICD-10-CM

## 2023-05-01 PROCEDURE — 99212 OFFICE O/P EST SF 10 MIN: CPT | Performed by: NURSE PRACTITIONER
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
## 2023-05-03 ENCOUNTER — PATIENT ROUNDING (BHMG ONLY) (OUTPATIENT)
Dept: NEUROLOGY | Facility: CLINIC | Age: 38
End: 2023-05-03
Payer: COMMERCIAL

## 2023-05-03 ENCOUNTER — TELEPHONE (OUTPATIENT)
Dept: NEUROLOGY | Facility: CLINIC | Age: 38
End: 2023-05-03
Payer: COMMERCIAL

## 2023-05-03 NOTE — PROGRESS NOTES
May 3, 2023    Hello, may I speak with Hebert Olmos?    My name is Pauline     I am  with [facility]  [address]  [address]  [facility] [address] [phone].    Before we get started may I verify your date of birth? 1985    I am calling to officially welcome you to our practice and ask about your recent visit. Is this a good time to talk? yes    Tell me about your visit with us. What things went well?  she said everything went well and she was just waiting on her MRI. I told her that I had contacted [facility] about her gastric pacemaker and im still waiting to hear back.        We're always looking for ways to make our patients' experiences even better. Do you have recommendations on ways we may improve?  no    Overall were you satisfied with your first visit to our practice? yes       I appreciate you taking the time to speak with me today. Is there anything else I can do for you? no      Thank you, and have a great day.

## 2023-05-03 NOTE — TELEPHONE ENCOUNTER
CALLED [facility] TO VERIFY IF PATIENTS GASTRIC PACE MAKER WAS MRI COMPATIBLE I SPOKE WITH A REPRESENTATIVE AND SHE STATED IT WASN'T BUT SHE SENT ME TO A NURSE TO VERIFY. SHE DID NOT ANSWER BUT I LEFT A VOICEMAIL FOR A RETURN CALL TO FULLY VERIFY THAT.

## 2023-05-04 NOTE — TELEPHONE ENCOUNTER
Provider: ALY HENDERSON APRN    Caller: SANKET    Relationship to Patient: SELF    Phone Number: [phone]    .Reason for Call: PT CALLING REGARDING THE MRI OF THE NECK.  STATED SHE CALLED [facility] AND THEY ALSO TOLD HER THE MRI WAS NOT COMPATIBLE WITH THE STIMULATOR.   STATED PT COULD HAVE A CT SCAN W/CONTRAST AND A FEW OTHERS THAT PT IS NOT ABLE TO REMEMBER AT THIS TIME.       PLEASE CALL & ADVISE

## 2023-05-04 NOTE — TELEPHONE ENCOUNTER
CALLED PATIENT BACK TO LET HER KNOW THAT I GOT HER MESSAGE AND THAT ALY WAS OUT TODAY AND TOMORROW BUT I WOULD LET HIM KNOW AND WHEN HE DECIDED WHAT HE WANTED TO ORDER THAT I WOULD GIVE HER A CALL. PATIENT VOICED UNDERSTANDING

## 2023-05-10 DIAGNOSIS — R20.0 NUMBNESS AND TINGLING IN RIGHT HAND: Primary | ICD-10-CM

## 2023-05-10 DIAGNOSIS — R20.0 NUMBNESS IN CERVICAL DERMATOME DISTRIBUTION: ICD-10-CM

## 2023-05-10 DIAGNOSIS — R20.2 NUMBNESS AND TINGLING IN RIGHT HAND: Primary | ICD-10-CM

## 2023-05-10 DIAGNOSIS — M54.2 NECK PAIN: ICD-10-CM

## 2023-05-26 ENCOUNTER — HOSPITAL ENCOUNTER (OUTPATIENT)
Dept: CT IMAGING | Facility: HOSPITAL | Age: 38
Discharge: HOME | End: 2023-05-26
Payer: COMMERCIAL

## 2023-05-26 ENCOUNTER — TELEPHONE (OUTPATIENT)
Dept: NEUROLOGY | Facility: CLINIC | Age: 38
End: 2023-05-26
Payer: COMMERCIAL

## 2023-05-26 DIAGNOSIS — M50.30 DEGENERATIVE DISC DISEASE, CERVICAL: Primary | ICD-10-CM

## 2023-05-26 DIAGNOSIS — R20.2 NUMBNESS AND TINGLING IN RIGHT HAND: ICD-10-CM

## 2023-05-26 DIAGNOSIS — R20.0 NUMBNESS AND TINGLING IN RIGHT HAND: ICD-10-CM

## 2023-05-26 DIAGNOSIS — R20.0 NUMBNESS IN CERVICAL DERMATOME DISTRIBUTION: ICD-10-CM

## 2023-05-26 DIAGNOSIS — M54.2 NECK PAIN: ICD-10-CM

## 2023-05-26 PROCEDURE — 72125 CT NECK SPINE W/O DYE: CPT

## 2023-05-26 NOTE — TELEPHONE ENCOUNTER
CALLED PATIENT AND GAVE HER THE RESULTS OF THE CT SPINE. SHE APPROVED HAVING A NEUROSURGERY REFERRAL SENT. I TOLD HER I WOULD LET ALY KNOW TO PLACE THE REFERRAL. PATIENT VOICED UNDERSTANDING

## 2023-05-26 NOTE — TELEPHONE ENCOUNTER
----- Message from YELENA Olvera sent at 5/26/2023  1:57 PM CDT -----  There are degenerative changes as multiple levels.  This could explain her neck pain. I recommend Neurosurgery evaluation.

## 2023-06-06 ENCOUNTER — OFFICE VISIT (OUTPATIENT)
Dept: FAMILY MEDICINE CLINIC | Facility: CLINIC | Age: 38
End: 2023-06-06
Payer: COMMERCIAL

## 2023-06-06 VITALS
HEIGHT: 67 IN | DIASTOLIC BLOOD PRESSURE: 76 MMHG | SYSTOLIC BLOOD PRESSURE: 118 MMHG | HEART RATE: 80 BPM | WEIGHT: 157.2 LBS | BODY MASS INDEX: 24.67 KG/M2

## 2023-06-06 DIAGNOSIS — M54.12 CERVICAL RADICULOPATHY: Chronic | ICD-10-CM

## 2023-06-06 DIAGNOSIS — G44.86 CERVICOGENIC HEADACHE: Primary | ICD-10-CM

## 2023-06-06 PROBLEM — R20.0 NUMBNESS AND TINGLING IN RIGHT HAND: Status: RESOLVED | Noted: 2023-01-23 | Resolved: 2023-06-06

## 2023-06-06 PROBLEM — B34.9 VIRAL ILLNESS: Status: RESOLVED | Noted: 2023-03-07 | Resolved: 2023-06-06

## 2023-06-06 PROBLEM — R20.2 NUMBNESS AND TINGLING IN RIGHT HAND: Status: RESOLVED | Noted: 2023-01-23 | Resolved: 2023-06-06

## 2023-06-06 PROBLEM — Z87.828 HISTORY OF OPEN HAND WOUND: Status: RESOLVED | Noted: 2023-01-23 | Resolved: 2023-06-06

## 2023-06-06 PROBLEM — M25.531 RIGHT WRIST PAIN: Status: RESOLVED | Noted: 2023-01-23 | Resolved: 2023-06-06

## 2023-06-06 PROCEDURE — 99213 OFFICE O/P EST LOW 20 MIN: CPT | Performed by: FAMILY MEDICINE

## 2023-06-06 RX ORDER — AMITRIPTYLINE HYDROCHLORIDE 25 MG/1
TABLET, FILM COATED ORAL
Qty: 30 TABLET | Refills: 3 | Status: SHIPPED | OUTPATIENT
Start: 2023-06-06

## 2023-06-06 NOTE — PROGRESS NOTES
Subjective   Hebert Olmos is a 37 y.o. female.  Resting evaluation of headaches.  Is known to have cervical disc disease chronic neck pain.  Seeing neurology group in [place] for same.  Now has a stimulator in for gastroparesis and is unable to have full MRI.  Complaining of headaches at the back of the neck scalp sound probably neck related.  Been taking a great deal of Tylenol and Excedrin which is created around Gesic rebound.  No direct trauma.  Chart reviewed.    History of Present Illness   Headache    The following portions of the patient's history were reviewed and updated as appropriate: allergies, current medications, past family history, past medical history, past social history, past surgical history, and problem list.    Review of Systems  Review of Systems   Constitutional:  Negative for activity change, appetite change, fatigue and unexpected weight change.   HENT:  Negative for trouble swallowing and voice change.    Eyes:  Negative for redness and visual disturbance.   Respiratory:  Negative for cough and wheezing.    Cardiovascular:  Negative for chest pain and palpitations.   Gastrointestinal:  Negative for abdominal pain, constipation, diarrhea, nausea and vomiting.   Genitourinary:  Negative for urgency.   Musculoskeletal:  Positive for neck pain and neck stiffness. Negative for joint swelling.   Neurological:  Positive for headaches. Negative for syncope.   Hematological:  Negative for adenopathy.   Psychiatric/Behavioral:  Negative for sleep disturbance.      Objective   Physical Exam  Physical Exam  Constitutional:       Appearance: She is well-developed.   HENT:      Head: Normocephalic.   Eyes:      Pupils: Pupils are equal, round, and reactive to light.   Neck:      Thyroid: No thyromegaly.      Comments: Mild stiffness full range of motion worse after about 45 degrees flexion extension rotation.  Negative pronator drift get up and go 3 to 5 seconds  Cardiovascular:      Rate and  "Rhythm: Normal rate.      Heart sounds: No murmur heard.    No friction rub. No gallop.   Pulmonary:      Breath sounds: Normal breath sounds.   Abdominal:      General: There is no distension.      Palpations: Abdomen is soft. There is no mass.      Tenderness: There is no abdominal tenderness.   Musculoskeletal:         General: Normal range of motion.      Cervical back: Normal range of motion.   Skin:     General: Skin is warm and dry.   Neurological:      Mental Status: She is alert and oriented to person, place, and time.      Deep Tendon Reflexes: Reflexes are normal and symmetric.   Psychiatric:         Mood and Affect: Mood is anxious.         Speech: Speech is rapid and pressured.         Behavior: Behavior is cooperative.         Visit Vitals  /76   Pulse 80   Ht 170.2 cm (67\")   Wt 71.3 kg (157 lb 3.2 oz)   BMI 24.62 kg/m²     Body mass index is 24.62 kg/m².  /76   Pulse 80   Ht 170.2 cm (67\")   Wt 71.3 kg (157 lb 3.2 oz)   BMI 24.62 kg/m²       Assessment/Plan   Diagnoses and all orders for this visit:    1. Cervicogenic headache (Primary)  -     amitriptyline (ELAVIL) 25 MG tablet; 1 p.o. nightly for headaches until visiting with neurology  Dispense: 30 tablet; Refill: 3    2. Cervical radiculopathy  -     amitriptyline (ELAVIL) 25 MG tablet; 1 p.o. nightly for headaches until visiting with neurology  Dispense: 30 tablet; Refill: 3    Counseled symptoms sound similar to neck related headache.  Counseled be deferring to her neck specialist for same.  Counseled stopping over-the-counter medication.  Low-dose amitriptyline at night to help relax musculature and help with headache control until visiting with her neurology group.  Recheck as needed  "

## 2023-08-03 ENCOUNTER — HOSPITAL ENCOUNTER (OUTPATIENT)
Dept: PHYSICAL THERAPY | Facility: HOSPITAL | Age: 38
Setting detail: THERAPIES SERIES
Discharge: HOME | End: 2023-08-03
Payer: COMMERCIAL

## 2023-08-03 DIAGNOSIS — M79.601 PAIN OF RIGHT UPPER EXTREMITY: ICD-10-CM

## 2023-08-03 DIAGNOSIS — R20.0 NUMBNESS AND TINGLING OF UPPER EXTREMITY: ICD-10-CM

## 2023-08-03 DIAGNOSIS — R20.2 NUMBNESS AND TINGLING OF UPPER EXTREMITY: ICD-10-CM

## 2023-08-03 DIAGNOSIS — M54.2 CERVICALGIA: Primary | ICD-10-CM

## 2023-08-03 PROCEDURE — 97161 PT EVAL LOW COMPLEX 20 MIN: CPT | Performed by: PHYSICAL THERAPIST

## 2023-08-15 DIAGNOSIS — R20.2 NUMBNESS AND TINGLING OF RIGHT UPPER EXTREMITY: ICD-10-CM

## 2023-08-15 DIAGNOSIS — M54.2 CERVICALGIA: ICD-10-CM

## 2023-08-15 DIAGNOSIS — M79.601 PAIN OF RIGHT UPPER EXTREMITY: ICD-10-CM

## 2023-08-15 DIAGNOSIS — R20.0 NUMBNESS AND TINGLING OF RIGHT UPPER EXTREMITY: ICD-10-CM

## 2023-08-15 RX ORDER — MELOXICAM 15 MG/1
15 TABLET ORAL DAILY
Qty: 30 TABLET | Refills: 0 | Status: SHIPPED | OUTPATIENT
Start: 2023-08-15

## 2023-08-15 RX ORDER — CYCLOBENZAPRINE HCL 10 MG
10 TABLET ORAL NIGHTLY
Qty: 30 TABLET | Refills: 0 | Status: SHIPPED | OUTPATIENT
Start: 2023-08-15

## 2023-08-31 ENCOUNTER — OFFICE VISIT (OUTPATIENT)
Dept: NEUROLOGY | Facility: CLINIC | Age: 38
End: 2023-08-31
Payer: COMMERCIAL

## 2023-08-31 VITALS
BODY MASS INDEX: 25.9 KG/M2 | OXYGEN SATURATION: 98 % | WEIGHT: 165 LBS | HEART RATE: 75 BPM | SYSTOLIC BLOOD PRESSURE: 132 MMHG | DIASTOLIC BLOOD PRESSURE: 68 MMHG | HEIGHT: 67 IN

## 2023-08-31 DIAGNOSIS — G44.86 CERVICOGENIC HEADACHE: ICD-10-CM

## 2023-08-31 DIAGNOSIS — M54.12 CERVICAL RADICULOPATHY: Chronic | ICD-10-CM

## 2023-08-31 RX ORDER — PROMETHAZINE HYDROCHLORIDE 25 MG/1
25 TABLET ORAL EVERY 6 HOURS PRN
COMMUNITY
Start: 2023-08-15

## 2023-08-31 RX ORDER — AMITRIPTYLINE HYDROCHLORIDE 50 MG/1
TABLET, FILM COATED ORAL
Qty: 30 TABLET | Refills: 5 | Status: SHIPPED | OUTPATIENT
Start: 2023-08-31

## 2023-08-31 NOTE — PROGRESS NOTES
Neurology Progress Note    Chief Complaint:    Neck Pain  Numbness of Right arm and Hand    Subjective   History of Present Illness:  Hebert Olmos is a 38 y.o. female who presents today for neck pain and numbness of the right arm/hand.  She is routinely followed by Bienvenido Reed MD for primary care.     Numbness and Tingling  At our last visit we ordered PT and a CT cervical spine for review.  She does have a gastric stimulator which is incompatible with MRI.  This did show neuroforaminal narrowing at levels C5-6 and C6-7.  She was referred to neurosurgery who evaluated and ordered further PT and xrays.  She is due to see Dr. Fisher  on 9-.    Today, she continues to reports numbness and tingling in a C8 radicular distribution worsening symptoms more proximal in her arm.  She continues to have significant neck pain.  She states that she was unable to continue with physical therapy secondary to the amount of pain she was in.  She continues to take Mobic and Flexeril that was provided by neurosurgery and reports this does help mitigate her pain.  She had seen her primary care provider for occipital headaches which are likely secondary to her neck.  These are aching in nature and do not have any migrainous features with them.  He started her on amitriptyline 25 mg nightly and she reports that this helped lessen some of the headaches but she continues to have them.  She denies any side effects of her Elavil at this time.    Allergies:    Eggs or egg-derived products, Fish-derived products, Milk-related compounds, and Other    Medications:  Current Outpatient Medications   Medication Sig Dispense Refill    albuterol (ACCUNEB) 1.25 MG/3ML nebulizer solution Take 3 mL by nebulization Every 6 (Six) Hours As Needed for Wheezing. 90 vial 12    amitriptyline (ELAVIL) 50 MG tablet 1 p.o. nightly for headaches until visiting with neurology 30 tablet 5    beclomethasone (QVAR) 80 MCG/ACT inhaler Inhale 1  puff 2 (Two) Times a Day.      cyclobenzaprine (FLEXERIL) 10 MG tablet TAKE 1 TABLET BY MOUTH EVERY NIGHT 30 tablet 0    dicyclomine (BENTYL) 10 MG capsule 1 tid to qid prn cramps and diarrhea 20 capsule 1    gabapentin (NEURONTIN) 300 MG capsule Take 1 capsule by mouth Daily. 30 capsule 0    ibuprofen (ADVIL,MOTRIN) 100 MG/5ML suspension Take 20 mL by mouth Every 6 (Six) Hours As Needed for Mild Pain . 473 mL 1    ketorolac (TORADOL) 10 MG tablet Take 1 tablet by mouth Every 6 (Six) Hours As Needed for Moderate Pain. 20 tablet 0    lactulose (CHRONULAC) 10 GM/15ML solution solution (encephalopathy) TAKE 15 MLS BY MOUTH TWICE DAILY      linaclotide (LINZESS) 72 MCG capsule capsule Take 1 capsule by mouth Daily.      meloxicam (MOBIC) 15 MG tablet TAKE 1 TABLET BY MOUTH DAILY 30 tablet 0    methocarbamol (ROBAXIN) 750 MG tablet Take 1 tablet by mouth At Night As Needed for Muscle Spasms. 30 tablet 0    methylPREDNISolone (MEDROL) 4 MG dose pack Take as directed on package instructions. 21 tablet 0    ondansetron ODT (Zofran ODT) 8 MG disintegrating tablet Place 1 tablet on the tongue Every 8 (Eight) Hours As Needed for Nausea or Vomiting. 20 tablet 0    prochlorperazine (COMPAZINE) 10 MG tablet       promethazine (PHENERGAN) 25 MG tablet Take 1 tablet by mouth Every 6 (Six) Hours As Needed.      Scopolamine (Transderm-Scop, 1.5 MG,) 1 MG/3DAYS patch 1 to skin q 2-3 days for nausea control 48 patch 0     No current facility-administered medications for this visit.     Current outpatient and discharge medications have been reconciled for the patient.  Reviewed by: YELENA Olvera    Past Medical History:  Past Medical History:   Diagnosis Date    Abdominal pain     RLQ    Abdominal pain     RUQ    Allergic rhinitis     Alopecia     Anxiety     Asthma     Bipolar 1 disorder     Chest pain     Chronic abdominal pain     Chronic diarrhea     Chronic post-traumatic stress disorder     Cluster headache Around about  "End of October    Constipation     CTS (carpal tunnel syndrome) November 2022    Hard too keep  as well as hold for a period of time    Depressive disorder     Dysmenorrhea     Suspect secondary to adenomyosis       Dyspnea     Epigastric pain     Gastritis     Generalized abdominal pain     Generalized anxiety disorder     Hematochezia     IBS (irritable bowel syndrome)     Without diarrhea    Low back pain     Nausea & vomiting     Pain in pelvis     Tachycardia      Past Surgical History:   Procedure Laterality Date    COLONOSCOPY W/ POLYPECTOMY  04/17/2015    Internal and external hemorrhoids found    ESOPHAGOSCOPY / EGD  04/17/2015    With biopsy-Normal esophagus. Gastritis was found in the stomach. Biopsy taken. Normal duodenum. Biopsy taken    INJECTION OF MEDICATION  01/08/2014    Kenalog    INJECTION OF MEDICATION  04/11/2014    Toradol    OTHER SURGICAL HISTORY  11/26/2014    Gynecologic surgery-Exam under anesthesia, diagnostic hysteroscopy, fractional dilation and curettage, balloon thermal ablation    TUBAL ABDOMINAL LIGATION  10/19/2011    with Filshie clips & removal of intrauterine device    VAGINAL DELIVERY  2010    x3     Family History   Problem Relation Age of Onset    Heart disease Other      Social History     Tobacco Use    Smoking status: Former    Smokeless tobacco: Never   Vaping Use    Vaping Use: Never used   Substance Use Topics    Alcohol use: No    Drug use: No     Review of Systems   Musculoskeletal:  Positive for neck pain.   Neurological:  Positive for numbness. Negative for weakness.       Objective   Vital Signs:  Heart Rate:  [75] 75  BP: (132)/(68) 132/68      08/31/23  0958   Weight: 74.8 kg (165 lb)     170.2 cm (67\")  Body mass index is 25.84 kg/mý.    Physical Exam  Vitals reviewed.   Constitutional:       Appearance: Normal appearance.   HENT:      Head: Normocephalic.      Mouth/Throat:      Pharynx: Oropharynx is clear.   Eyes:      General: Lids are normal.      " Extraocular Movements: Extraocular movements intact.      Pupils: Pupils are equal, round, and reactive to light.   Cardiovascular:      Rate and Rhythm: Normal rate and regular rhythm.      Pulses: Normal pulses.   Pulmonary:      Effort: Pulmonary effort is normal.   Musculoskeletal:      Cervical back: Neck supple. Pain with movement and muscular tenderness present. Decreased range of motion.   Skin:     General: Skin is warm and dry.      Capillary Refill: Capillary refill takes less than 2 seconds.   Neurological:      Motor: Motor strength is normal.      Coordination: Coordination is intact.      Deep Tendon Reflexes: Reflexes are normal and symmetric.   Psychiatric:         Mood and Affect: Mood normal.         Speech: Speech normal.     Neurological Exam  Mental Status  Awake, alert and oriented to person, place and time. Recent and remote memory are intact. Speech is normal. Language is fluent with no aphasia. Attention and concentration are normal.    Cranial Nerves  CN II: Visual acuity is normal. Visual fields full to confrontation.  CN III, IV, VI: Extraocular movements intact bilaterally. Normal lids and orbits bilaterally. Pupils equal round and reactive to light bilaterally.  CN V: Facial sensation is normal.  CN VII: Full and symmetric facial movement.  CN IX, X: Palate elevates symmetrically. Normal gag reflex.  CN XI: Shoulder shrug strength is normal.  CN XII: Tongue midline without atrophy or fasciculations.    Motor   Strength is 5/5 throughout all four extremities.    Sensory  Sensation is intact to light touch, pinprick, vibration and proprioception in all four extremities.  Right: Loss of sensation in the C8 dermatome.  Decreased sensation on examination in a C8 distribution. .    Reflexes  Deep tendon reflexes are 2+ and symmetric in all four extremities.    Coordination    Finger-to-nose, rapid alternating movements and heel-to-shin normal bilaterally without dysmetria.    Gait  Normal  casual, toe, heel and tandem gait.  Results Review:    Lab Results   Component Value Date    GLUCOSE 75 04/08/2021    BUN 12 04/08/2021    CREATININE 0.77 04/08/2021    EGFRIFAFRI 102 04/08/2021    BCR 15.6 04/08/2021    K 4.1 04/08/2021    CO2 24.7 04/08/2021    CALCIUM 9.0 04/08/2021    ALBUMIN 4.20 04/08/2021    AST 16 04/08/2021    ALT 13 04/08/2021     Lab Results   Component Value Date    WBC 5.24 04/08/2021    HGB 13.4 04/08/2021    HCT 40.6 04/08/2021    MCV 95.1 04/08/2021     04/08/2021     Lab Results   Component Value Date    CHLPL 128 10/30/2015    TRIG 58 10/30/2015    HDL 43 (L) 10/30/2015    LDL 73 10/30/2015     Lab Results   Component Value Date    TSH 0.873 04/08/2021     No results found for: HGBA1C  No results found for: FOLATE  Lab Results   Component Value Date    TAPZDEHF76 477 04/08/2021     CT Cervical Spine Without Contrast (05/26/2023 11:55)   XR Spine Cervical Complete With Flex Ext (07/18/2023 12:41)     Office Visit with Jaxson Cruz APRN (07/18/2023)  Initial Evaluation with Em Tucker PT DPT (08/03/2023)     Plan .  Impression:  Hebert Olmos is a 38 y.o. female who presents for numbness and tingling of the right upper extremity and neck pain.  She continues to have these current symptoms but reports they are slightly better with the use of Mobic and Flexeril that was provided by neurosurgery.  She did have a CT scan which showed some degenerative changes with neuroforaminal narrowing that could be potential cause to her symptoms.  I continue to recommend she follow-up with neurosurgery for any further recommendations with her current neck pain and radicular symptoms.  With regard to her headache, we will increase her amitriptyline to 50 mg nightly as she noted some improvement with her symptoms but continues to have some degree of headache.  Do believe these are cervicogenic in nature as it did not sound migrainous.  I would recommend she resume physical therapy  but she notes that is simply too difficult with the amount of pain she was having.  She is to call me with any other questions or concerns.  I answered all questions that she had in the office today completely and she has no further concerns at the end of the visit.    Plan:  Increase amitriptyline to 50 mg nightly  Recommend cervical PT  Continue to follow with neurosurgery  Stretching recommended  Safety Discussed  Call with concerns.     The patient and I have discussed the plan of care and she is in full agreement at this time.     Follow-Up:  Return if symptoms worsen or fail to improve.         Emmett Dixon, YELENA  08/31/23  12:50 CDT

## 2023-09-08 ENCOUNTER — TELEPHONE (OUTPATIENT)
Dept: NEUROSURGERY | Facility: CLINIC | Age: 38
End: 2023-09-08
Payer: COMMERCIAL

## 2023-09-12 ENCOUNTER — OFFICE VISIT (OUTPATIENT)
Dept: NEUROSURGERY | Facility: CLINIC | Age: 38
End: 2023-09-12
Payer: COMMERCIAL

## 2023-09-12 VITALS — BODY MASS INDEX: 25.43 KG/M2 | HEIGHT: 67 IN | WEIGHT: 162 LBS

## 2023-09-12 DIAGNOSIS — M54.2 CERVICALGIA: Primary | ICD-10-CM

## 2023-09-12 DIAGNOSIS — R20.0 NUMBNESS AND TINGLING OF RIGHT UPPER EXTREMITY: ICD-10-CM

## 2023-09-12 DIAGNOSIS — E66.3 OVERWEIGHT (BMI 25.0-29.9): ICD-10-CM

## 2023-09-12 DIAGNOSIS — R20.2 NUMBNESS AND TINGLING OF RIGHT UPPER EXTREMITY: ICD-10-CM

## 2023-09-12 DIAGNOSIS — M79.601 PAIN OF RIGHT UPPER EXTREMITY: ICD-10-CM

## 2023-09-12 DIAGNOSIS — R29.898 WEAKNESS OF RIGHT UPPER EXTREMITY: ICD-10-CM

## 2023-09-12 PROCEDURE — 1160F RVW MEDS BY RX/DR IN RCRD: CPT | Performed by: NURSE PRACTITIONER

## 2023-09-12 PROCEDURE — 1159F MED LIST DOCD IN RCRD: CPT | Performed by: NURSE PRACTITIONER

## 2023-09-12 PROCEDURE — 99214 OFFICE O/P EST MOD 30 MIN: CPT | Performed by: NURSE PRACTITIONER

## 2023-09-12 RX ORDER — CYCLOBENZAPRINE HCL 10 MG
10 TABLET ORAL NIGHTLY
Qty: 30 TABLET | Refills: 0 | Status: SHIPPED | OUTPATIENT
Start: 2023-09-12

## 2023-09-12 NOTE — PATIENT INSTRUCTIONS
"DASH Eating Plan  DASH stands for Dietary Approaches to Stop Hypertension. The DASH eating plan is a healthy eating plan that has been shown to:  Reduce high blood pressure (hypertension).  Reduce your risk for type 2 diabetes, heart disease, and stroke.  Help with weight loss.  What are tips for following this plan?  Reading food labels  Check food labels for the amount of salt (sodium) per serving. Choose foods with less than 5 percent of the Daily Value of sodium. Generally, foods with less than 300 milligrams (mg) of sodium per serving fit into this eating plan.  To find whole grains, look for the word \"whole\" as the first word in the ingredient list.  Shopping  Buy products labeled as \"low-sodium\" or \"no salt added.\"  Buy fresh foods. Avoid canned foods and pre-made or frozen meals.  Cooking  Avoid adding salt when cooking. Use salt-free seasonings or herbs instead of table salt or sea salt. Check with your health care provider or pharmacist before using salt substitutes.  Do not [place] foods. Cook foods using healthy methods such as baking, boiling, grilling, roasting, and broiling instead.  Cook with heart-healthy oils, such as olive, canola, avocado, soybean, or sunflower oil.  Meal planning    Eat a balanced diet that includes:  4 or more servings of fruits and 4 or more servings of vegetables each day. Try to fill one-half of your plate with fruits and vegetables.  6-8 servings of whole grains each day.  Less than 6 oz (170 g) of lean meat, poultry, or fish each day. A 3-oz (85-g) serving of meat is about the same size as a deck of cards. One egg equals 1 oz (28 g).  2-3 servings of low-fat dairy each day. One serving is 1 cup (237 mL).  1 serving of nuts, seeds, or beans 5 times each week.  2-3 servings of heart-healthy fats. Healthy fats called omega-3 fatty acids are found in foods such as walnuts, flaxseeds, fortified milks, and eggs. These fats are also found in cold-water fish, such as sardines, salmon, " and mackerel.  Limit how much you eat of:  Canned or prepackaged foods.  Food that is high in trans fat, such as some fried foods.  Food that is high in saturated fat, such as fatty meat.  Desserts and other sweets, sugary drinks, and other foods with added sugar.  Full-fat dairy products.  Do not salt foods before eating.  Do not eat more than 4 egg yolks a week.  Try to eat at least 2 vegetarian meals a week.  Eat more home-cooked food and less restaurant, buffet, and fast food.  Lifestyle  When eating at a restaurant, ask that your food be prepared with less salt or no salt, if possible.  If you drink alcohol:  Limit how much you use to:  0-1 drink a day for women who are not pregnant.  0-2 drinks a day for men.  Be aware of how much alcohol is in your drink. In the [place] one drink equals one 12 oz bottle of beer (355 mL), one 5 oz glass of wine (148 mL), or one 1½ oz glass of hard liquor (44 mL).  General information  Avoid eating more than 2,300 mg of salt a day. If you have hypertension, you may need to reduce your sodium intake to 1,500 mg a day.  Work with your health care provider to maintain a healthy body weight or to lose weight. Ask what an ideal weight is for you.  Get at least 30 minutes of exercise that causes your heart to beat faster (aerobic exercise) most days of the week. Activities may include walking, swimming, or biking.  Work with your health care provider or dietitian to adjust your eating plan to your individual calorie needs.  What foods should I eat?  Fruits  All fresh, dried, or frozen fruit. Canned fruit in natural juice (without added sugar).  Vegetables  Fresh or frozen vegetables (raw, steamed, roasted, or grilled). Low-sodium or reduced-sodium tomato and vegetable juice. Low-sodium or reduced-sodium tomato sauce and tomato paste. Low-sodium or reduced-sodium canned vegetables.  Grains  Whole-grain or whole-wheat bread. Whole-grain or whole-wheat pasta. Brown rice. Oatmeal. Quinoa.  Bulgur. Whole-grain and low-sodium cereals. Smiley bread. Low-fat, low-sodium crackers. Whole-wheat flour tortillas.  Meats and other proteins  Skinless chicken or turkey. Ground chicken or turkey. Pork with fat trimmed off. Fish and seafood. Egg whites. Dried beans, peas, or lentils. Unsalted nuts, nut butters, and seeds. Unsalted canned beans. Lean cuts of beef with fat trimmed off. Low-sodium, lean precooked or cured meat, such as sausages or meat loaves.  Dairy  Low-fat (1%) or fat-free (skim) milk. Reduced-fat, low-fat, or fat-free cheeses. Nonfat, low-sodium ricotta or cottage cheese. Low-fat or nonfat yogurt. Low-fat, low-sodium cheese.  Fats and oils  Soft margarine without trans fats. Vegetable oil. Reduced-fat, low-fat, or light mayonnaise and salad dressings (reduced-sodium). Canola, safflower, olive, avocado, soybean, and sunflower oils. Avocado.  Seasonings and condiments  Herbs. Spices. Seasoning mixes without salt.  Other foods  Unsalted popcorn and pretzels. Fat-free sweets.  The items listed above may not be a complete list of foods and beverages you can eat. Contact a dietitian for more information.  What foods should I avoid?  Fruits  Canned fruit in a light or heavy syrup. Fried fruit. Fruit in cream or butter sauce.  Vegetables  Creamed or fried vegetables. Vegetables in a cheese sauce. Regular canned vegetables (not low-sodium or reduced-sodium). Regular canned tomato sauce and paste (not low-sodium or reduced-sodium). Regular tomato and vegetable juice (not low-sodium or reduced-sodium). Pickles. Olives.  Grains  Baked goods made with fat, such as croissants, muffins, or some breads. Dry pasta or rice meal packs.  Meats and other proteins  Fatty cuts of meat. Ribs. Fried meat. Stanton. Bologna, salami, and other precooked or cured meats, such as sausages or meat loaves. Fat from the back of a pig (fatback). Bratwurst. Salted nuts and seeds. Canned beans with added salt. Canned or smoked fish.  Whole eggs or egg yolks. Chicken or turkey with skin.  Dairy  Whole or 2% milk, cream, and half-and-half. Whole or full-fat cream cheese. Whole-fat or sweetened yogurt. Full-fat cheese. Nondairy creamers. Whipped toppings. Processed cheese and cheese spreads.  Fats and oils  Butter. Stick margarine. Lard. Shortening. Ghee. Stanton fat. Tropical oils, such as coconut, palm kernel, or palm oil.  Seasonings and condiments  Onion salt, garlic salt, seasoned salt, table salt, and sea salt. Worcestershire sauce. Tartar sauce. Barbecue sauce. Teriyaki sauce. Soy sauce, including reduced-sodium. Steak sauce. Canned and packaged gravies. Fish sauce. Oyster sauce. Cocktail sauce. Store-bought horseradish. Ketchup. Mustard. Meat flavorings and tenderizers. Bouillon cubes. Hot sauces. Pre-made or packaged marinades. Pre-made or packaged taco seasonings. Relishes. Regular salad dressings.  Other foods  Salted popcorn and pretzels.  The items listed above may not be a complete list of foods and beverages you should avoid. Contact a dietitian for more information.  Where to find more information  National Heart, Lung, and Blood [place]: www.nhlbi.nih.gov  American Heart Association: www.heart.org  Academy of Nutrition and Dietetics: www.eatright.org  National Kidney Foundation: www.kidney.org  Summary  The DASH eating plan is a healthy eating plan that has been shown to reduce high blood pressure (hypertension). It may also reduce your risk for type 2 diabetes, heart disease, and stroke.  When on the DASH eating plan, aim to eat more fresh fruits and vegetables, whole grains, lean proteins, low-fat dairy, and heart-healthy fats.  With the DASH eating plan, you should limit salt (sodium) intake to 2,300 mg a day. If you have hypertension, you may need to reduce your sodium intake to 1,500 mg a day.  Work with your health care provider or dietitian to adjust your eating plan to your individual calorie needs.  This information is not  intended to replace advice given to you by your health care provider. Make sure you discuss any questions you have with your health care provider.  Document Revised: 11/20/2020 Document Reviewed: 11/20/2020  Elsevier Patient Education © 2023 [employer]

## 2023-09-12 NOTE — PROGRESS NOTES
Chief complaint:   Chief Complaint   Patient presents with    Neck Pain     3 sessions of PT hasn't helped neck pain and right arm pain at all     Subjective     HPI:   Last encounter: 7/18/2023    Interval History: Hebert Olmos is a 38 y.o.  female who presents today for continued evaluation of neck and right arm pain, 50% neck, 50% right arm.    Ms. Olmos has done fairly well since we last saw her.  Completed 3 sessions of physical therapy, however discontinued due to worsening pain.  Her neck and right arm pain remain constant, waxing and waning in severity.  Her neck pain radiates to the posterior right deltoid and extends to the mid arm.  She additionally reports numbness and tingling in the same distribution that proceed to digits 4 and 5 in the right hand.  No significant aggravating factors as her discomfort is constant.  Minimal alleviation with continued use of Flexeril, meloxicam, gabapentin, and Toradol.  She continues to deny gait or balance abnormalities or falls, as well as fevers, chills, night sweats, unexplained weight loss, saddle anesthesia, or bowel or bladder dysfunction.  She currently rates the severity of her symptoms 9/10.  No additional concerns at this time.    Oswestry Disability Index = 76%   Score   Pain Intensity Very severe pain - 4   Personal Care Need help but manage most personal care-3   Lifting Pain prevents me from lifting heavy weights, but medium weights on table-3   Reading Hardly read because of severe pain-4   Headaches Headaches all the time-5   Concentration A lot of difficulty concentrating-3   Work I can hardly do any work-4   Driving I can hardly drive because of severe pain-4   Sleeping 3 to 5 hours of sleepiness-4   Recreation Hardly do any recreational activities-4     SCORE INTERPRETATION OF THE OSWESTRY NECK DISABILITY QUESTIONNAIRE  >70: Complete disability  (Pocahontas et al, 1980)    Modified [employer] (mJOA)  score  CATEGORY SCORE   Upper extremity Motor Subscore Mild difficulty buttoning shirt-4   Lower Extremity Subscore Mild imbalance when standing or walking-6   Upper Extremity Sensory Score Severe loss of hand sensation or pain-1   Urinary Function Subscore Urinary incontinence more than once per month-1   TOTAL = 12/18    The mJOA is an 18 point score of functional disability specific to cervical myelopathy.   12-14: Moderate Myelopathy  Vicki et al. (1991). Sadi et al.     The mJOA is an 18 point score of functional disability specific to cervical myelopathy. Vicki et al. (1991).[2]    ROS  Review of Systems   Constitutional: Negative.    HENT: Negative.     Eyes: Negative.    Respiratory: Negative.     Cardiovascular: Negative.    Gastrointestinal: Negative.    Endocrine: Negative.    Genitourinary: Negative.    Musculoskeletal:  Positive for neck pain and neck stiffness.   Skin: Negative.    Allergic/Immunologic: Negative.    Neurological: Negative.    Hematological: Negative.    Psychiatric/Behavioral: Negative.     All other systems reviewed and are negative.    PFSH:  Past Medical History:   Diagnosis Date    Abdominal pain     RLQ    Abdominal pain     RUQ    Allergic rhinitis     Alopecia     Anxiety     Asthma     Bipolar 1 disorder     Chest pain     Chronic abdominal pain     Chronic diarrhea     Chronic post-traumatic stress disorder     Cluster headache Around about End of October    Constipation     CTS (carpal tunnel syndrome) November 2022    Hard too keep  as well as hold for a period of time    Depressive disorder     Dysmenorrhea     Suspect secondary to adenomyosis       Dyspnea     Epigastric pain     Gastritis     Generalized abdominal pain     Generalized anxiety disorder     Hematochezia     IBS (irritable bowel syndrome)     Without diarrhea    Low back pain     Nausea & vomiting     Pain in pelvis     Tachycardia      Past Surgical History:   Procedure Laterality Date     COLONOSCOPY W/ POLYPECTOMY  04/17/2015    Internal and external hemorrhoids found    ESOPHAGOSCOPY / EGD  04/17/2015    With biopsy-Normal esophagus. Gastritis was found in the stomach. Biopsy taken. Normal duodenum. Biopsy taken    INJECTION OF MEDICATION  01/08/2014    Kenalog    INJECTION OF MEDICATION  04/11/2014    Toradol    OTHER SURGICAL HISTORY  11/26/2014    Gynecologic surgery-Exam under anesthesia, diagnostic hysteroscopy, fractional dilation and curettage, balloon thermal ablation    TUBAL ABDOMINAL LIGATION  10/19/2011    with Filshie clips & removal of intrauterine device    VAGINAL DELIVERY  2010    x3     Objective      Current Outpatient Medications   Medication Sig Dispense Refill    albuterol (ACCUNEB) 1.25 MG/3ML nebulizer solution Take 3 mL by nebulization Every 6 (Six) Hours As Needed for Wheezing. 90 vial 12    amitriptyline (ELAVIL) 50 MG tablet 1 p.o. nightly for headaches until visiting with neurology 30 tablet 5    beclomethasone (QVAR) 80 MCG/ACT inhaler Inhale 1 puff 2 (Two) Times a Day.      cyclobenzaprine (FLEXERIL) 10 MG tablet Take 1 tablet by mouth Every Night. 30 tablet 0    dicyclomine (BENTYL) 10 MG capsule 1 tid to qid prn cramps and diarrhea 20 capsule 1    gabapentin (NEURONTIN) 300 MG capsule Take 1 capsule by mouth Daily. 30 capsule 0    ibuprofen (ADVIL,MOTRIN) 100 MG/5ML suspension Take 20 mL by mouth Every 6 (Six) Hours As Needed for Mild Pain . 473 mL 1    ketorolac (TORADOL) 10 MG tablet Take 1 tablet by mouth Every 6 (Six) Hours As Needed for Moderate Pain. 20 tablet 0    lactulose (CHRONULAC) 10 GM/15ML solution solution (encephalopathy) TAKE 15 MLS BY MOUTH TWICE DAILY      linaclotide (LINZESS) 72 MCG capsule capsule Take 1 capsule by mouth Daily.      meloxicam (MOBIC) 15 MG tablet TAKE 1 TABLET BY MOUTH DAILY 30 tablet 0    ondansetron ODT (Zofran ODT) 8 MG disintegrating tablet Place 1 tablet on the tongue Every 8 (Eight) Hours As Needed for Nausea or Vomiting.  "20 tablet 0    prochlorperazine (COMPAZINE) 10 MG tablet       promethazine (PHENERGAN) 25 MG tablet Take 1 tablet by mouth Every 6 (Six) Hours As Needed.      Scopolamine (Transderm-Scop, 1.5 MG,) 1 MG/3DAYS patch 1 to skin q 2-3 days for nausea control 48 patch 0     No current facility-administered medications for this visit.     Vital Signs  Ht 170.2 cm (67\")   Wt 73.5 kg (162 lb)   BMI 25.37 kg/m²   Physical Exam  Vitals and nursing note reviewed.   Constitutional:       General: She is not in acute distress.     Appearance: Normal appearance. She is well-developed, well-groomed and overweight. She is not ill-appearing, toxic-appearing or diaphoretic.      Comments: BMI 25.84   HENT:      Head: Normocephalic and atraumatic.      Right Ear: Hearing normal.      Left Ear: Hearing normal.   Eyes:      Extraocular Movements: EOM normal.      Conjunctiva/sclera: Conjunctivae normal.      Pupils: Pupils are equal, round, and reactive to light.   Neck:      Trachea: Trachea normal.   Cardiovascular:      Rate and Rhythm: Normal rate and regular rhythm.   Pulmonary:      Effort: Pulmonary effort is normal. No tachypnea, bradypnea, accessory muscle usage or respiratory distress.   Abdominal:      Palpations: Abdomen is soft.   Musculoskeletal:      Cervical back: Full passive range of motion without pain and neck supple.   Skin:     General: Skin is warm and dry.   Neurological:      Mental Status: She is alert and oriented to person, place, and time.      GCS: GCS eye subscore is 4. GCS verbal subscore is 5. GCS motor subscore is 6.      Gait: Gait is intact.      Deep Tendon Reflexes:      Reflex Scores:       Tricep reflexes are 3+ on the right side and 3+ on the left side.       Bicep reflexes are 3+ on the right side and 3+ on the left side.       Brachioradialis reflexes are 3+ on the right side and 3+ on the left side.       Patellar reflexes are 3+ on the right side and 3+ on the left side.       Achilles " reflexes are 3+ on the right side and 3+ on the left side.  Psychiatric:         Speech: Speech normal.         Behavior: Behavior normal. Behavior is cooperative.     Neurologic Exam     Mental Status   Oriented to person, place, and time.   Attention: normal. Concentration: normal.   Speech: speech is normal   Level of consciousness: alert    Cranial Nerves     CN II   Visual fields full to confrontation.     CN III, IV, VI   Pupils are equal, round, and reactive to light.  Extraocular motions are normal.     CN V   Facial sensation intact.     CN VII   Facial expression full, symmetric.     CN VIII   CN VIII normal.     CN IX, X   CN IX normal.     CN XI   CN XI normal.     Motor Exam   Right arm tone: normal  Left arm tone: normal  Right leg tone: normal  Left leg tone: normal    Strength   Right deltoid: 5/5  Left deltoid: 5/5  Right biceps: 5/5  Left biceps: 5/5  Right triceps: 5/5  Left triceps: 5/5  Right wrist extension: 5/5  Left wrist extension: 5/5  Right iliopsoas: 5/5  Left iliopsoas: 5/5  Right quadriceps: 5/5  Left quadriceps: 5/5  Right anterior tibial: 5/5  Left anterior tibial: 5/5  Right gastroc: 5/5  Left gastroc: 5/5  Right EHL 5/5  Left EHL 5/5       Sensory Exam   Left arm light touch: normal  Right leg light touch: normal  Left leg light touch: normal  Left leg proprioception: normal    Positive Tinel's over the right cubital fossa     Gait, Coordination, and Reflexes     Gait  Gait: normal    Tremor   Resting tremor: absent  Intention tremor: absent  Action tremor: absent    Reflexes   Right brachioradialis: 3+  Left brachioradialis: 3+  Right biceps: 3+  Left biceps: 3+  Right triceps: 3+  Left triceps: 3+  Right patellar: 3+  Left patellar: 3+  Right achilles: 3+  Left achilles: 3+  Right plantar: normal  Left plantar: normal  Right Quispe: absent  Left Quispe: absent  Right ankle clonus: absent  Left ankle clonus: absent  Right pendular knee jerk: absent  Left pendular knee jerk:  absent  (12 bullet pts)    Female  strength (pounds)  AGE Right Hand RH Norms Left Hand LH Norms   20-24   70+14.5   61+13.1   25-29   75+13.9   63.5+12   30-34   79+19.2   68+17.7   35-39 *55,52 74+10.8 75,84 66+11.7   40-44   70+13.5   62+13.8   45-49   62+15.1   56+12.7   50-54   66+11.6   57+10.7   55-59   57+12.5   47+11.9   60-64   55+10.1   46+10.1   65-69   50+9.7   41+8.2   70-74   50+11.7   42+10.2   75+   43+11.0   38+8.9   (JOE Bradley et al; Hand Dynometer: Effects of trials and sessions.  Perpetual and Motor Skills 61:195-8, 1985)  * = Dominant hand  > = Intervention    Results Review:   5/26/2023.  CT of the cervical spine shows reversal of the normal cervical lordosis and degenerative disc disease most notable from C5-7      7/18/2023        Assessment/Plan:   Hebert Olmos is a 38 y.o. female with a significant medical history of asthma, bipolar disorder, depression, anxiety, and she is overweight.  She presents with a known problem of progressively worsening neck and right arm pain and dysesthesias in the C7-8 distribution, 50 % neck, 50 % right arm.  DOUGLAS: 76.  mJOA: 12.  Physical exam findings of right  strength is approximately 2 standard deviations below age-matched controls, positive Tinel's over the right cubital fossa, and mild gross hyperreflexia without pathologic reflexes.  Her imaging shows reversal of the normal cervical lordosis and degenerative disc disease most notable from C4-C7, no signs of instability with flexion or extension     RECOMMENDATIONS ...  Cervicalgia  Right arm pain and dysesthesias  Right upper extremity weakness  Differential diagnosis include, but are not limited to cervical stenosis with radiculopathy, cervical disc disease, and myofacial pain.     Ms. Olmos continues to complain of constant neck and right arm pain in the C7 and C8 distribution.  She recently completed 3 sessions of physical therapy, however discontinued due to worsening pain.  For  further evaluation we will send her for noncontrasted MRI of the cervical spine to rule out cervical stenosis and need for surgical intervention, as well as an EMG and nerve conduction study of the right upper extremity to confirm or refute radiculopathy from the cervical spine and/or a peripheral neuropathy as a causative factor for her right upper extremity dysesthesias.  I will provide her with a refill of Flexeril today.  We will have her return for reassessment with Dr. Delgado after all studies been completed.  Ms. Olmos knows that she may contact the neurosurgical clinic to return sooner for any new or additional concerns and agree with this plan of care.     Overweight (BMI 25.0-29.9)  Body mass index is 25.37 kg/m²..  Information on the DASH diet provided in the AVS.  We will continue to provided diet and exercise information with the goal of weight loss at each scheduled appointment.     Diagnoses and all orders for this visit:    1. Cervicalgia (Primary)  -     MRI Cervical Spine Without Contrast; Future  -     EMG & Nerve Conduction Test; Future  -     cyclobenzaprine (FLEXERIL) 10 MG tablet; Take 1 tablet by mouth Every Night.  Dispense: 30 tablet; Refill: 0    2. Pain of right upper extremity  -     MRI Cervical Spine Without Contrast; Future  -     EMG & Nerve Conduction Test; Future    3. Numbness and tingling of right upper extremity  -     MRI Cervical Spine Without Contrast; Future  -     EMG & Nerve Conduction Test; Future    4. Weakness of right upper extremity  -     MRI Cervical Spine Without Contrast; Future  -     EMG & Nerve Conduction Test; Future    5. Overweight (BMI 25.0-29.9)      Return for FOLLOW UP WITH DR. DELGADO NEXT AVAILABLE WITH MRI.    Medical Decision Making (2/3)  Problem Points (2,3,4 or more)  Undiagnosed new problem (Mod)  Data Points (2,3,4 or more)  Imaging Ordered (X-ray,CT, MRI) = 1.  Imaging Independent Review = 1  Ordered other tests (EMG, NCS, GODFREY, echo, ekg,  PFT) = 1.  Risk (Low, Mod, High)  RX: Prescription Rx management (Moderate)    E/M = MDM 2 out of 3   or  Time  Est Level 4 - 04633 = Mod + (Cat1=3pts or Cat2 or Cat3) + Mod Risk   or   45-59 min     Thank you, for allowing me to continue to participate in the care of this patient.    Sincerely,  Jaxson Cruz, APRN

## 2023-11-13 ENCOUNTER — TELEPHONE (OUTPATIENT)
Dept: NEUROLOGY | Facility: HOSPITAL | Age: 38
End: 2023-11-13

## 2023-11-14 ENCOUNTER — HOSPITAL ENCOUNTER (OUTPATIENT)
Dept: MRI IMAGING | Facility: HOSPITAL | Age: 38
Discharge: HOME | End: 2023-11-14
Payer: COMMERCIAL

## 2024-01-26 ENCOUNTER — TELEPHONE (OUTPATIENT)
Dept: NEUROSURGERY | Facility: CLINIC | Age: 39
End: 2024-01-26
Payer: COMMERCIAL

## 2024-01-26 NOTE — TELEPHONE ENCOUNTER
No voicemail set up. Attempted to call patient and let her know we have to cancel her upcoming appt on 1/29 due to her not having her MRI done. Looks like she no showed for it. Patient needs to have MRI done before we can see her back in office.